# Patient Record
Sex: MALE | Race: WHITE | NOT HISPANIC OR LATINO | Employment: OTHER | URBAN - METROPOLITAN AREA
[De-identification: names, ages, dates, MRNs, and addresses within clinical notes are randomized per-mention and may not be internally consistent; named-entity substitution may affect disease eponyms.]

---

## 2017-03-27 ENCOUNTER — ANESTHESIA EVENT (OUTPATIENT)
Dept: SURGERY | Facility: CLINIC | Age: 77
DRG: 470 | End: 2017-03-27
Payer: COMMERCIAL

## 2017-03-27 RX ORDER — GLIMEPIRIDE 2 MG/1
2 TABLET ORAL EVERY MORNING
COMMUNITY

## 2017-03-27 RX ORDER — LIRAGLUTIDE 6 MG/ML
1.2 INJECTION SUBCUTANEOUS EVERY MORNING
COMMUNITY

## 2017-03-27 ASSESSMENT — LIFESTYLE VARIABLES: TOBACCO_USE: 1

## 2017-03-28 ENCOUNTER — APPOINTMENT (OUTPATIENT)
Dept: GENERAL RADIOLOGY | Facility: CLINIC | Age: 77
DRG: 470 | End: 2017-03-28
Attending: ORTHOPAEDIC SURGERY
Payer: COMMERCIAL

## 2017-03-28 ENCOUNTER — ANESTHESIA (OUTPATIENT)
Dept: SURGERY | Facility: CLINIC | Age: 77
DRG: 470 | End: 2017-03-28
Payer: COMMERCIAL

## 2017-03-28 ENCOUNTER — HOSPITAL ENCOUNTER (INPATIENT)
Facility: CLINIC | Age: 77
LOS: 1 days | Discharge: HOME | DRG: 470 | End: 2017-03-29
Attending: ORTHOPAEDIC SURGERY | Admitting: ORTHOPAEDIC SURGERY
Payer: COMMERCIAL

## 2017-03-28 ENCOUNTER — APPOINTMENT (OUTPATIENT)
Dept: PHYSICAL THERAPY | Facility: CLINIC | Age: 77
DRG: 470 | End: 2017-03-28
Attending: ORTHOPAEDIC SURGERY
Payer: COMMERCIAL

## 2017-03-28 DIAGNOSIS — M19.90 DJD (DEGENERATIVE JOINT DISEASE): ICD-10-CM

## 2017-03-28 DIAGNOSIS — Z96.642 STATUS POST LEFT HIP REPLACEMENT: Primary | ICD-10-CM

## 2017-03-28 LAB
ABO + RH BLD: NORMAL
ABO + RH BLD: NORMAL
BLD GP AB SCN SERPL QL: NORMAL
BLOOD BANK CMNT PATIENT-IMP: NORMAL
CREAT SERPL-MCNC: 0.94 MG/DL (ref 0.66–1.25)
GFR SERPL CREATININE-BSD FRML MDRD: 78 ML/MIN/1.7M2
GLUCOSE BLDC GLUCOMTR-MCNC: 189 MG/DL (ref 70–99)
GLUCOSE BLDC GLUCOMTR-MCNC: 196 MG/DL (ref 70–99)
GLUCOSE BLDC GLUCOMTR-MCNC: 230 MG/DL (ref 70–99)
GLUCOSE BLDC GLUCOMTR-MCNC: 237 MG/DL (ref 70–99)
GLUCOSE BLDC GLUCOMTR-MCNC: 248 MG/DL (ref 70–99)
GLUCOSE SERPL-MCNC: 175 MG/DL (ref 70–99)
HGB BLD-MCNC: 14 G/DL (ref 13.3–17.7)
PLATELET # BLD AUTO: 184 10E9/L (ref 150–450)
POTASSIUM SERPL-SCNC: 4.5 MMOL/L (ref 3.4–5.3)
SPECIMEN EXP DATE BLD: NORMAL

## 2017-03-28 PROCEDURE — 36000063 ZZH SURGERY LEVEL 4 EA 15 ADDTL MIN: Performed by: ORTHOPAEDIC SURGERY

## 2017-03-28 PROCEDURE — 86850 RBC ANTIBODY SCREEN: CPT | Performed by: ORTHOPAEDIC SURGERY

## 2017-03-28 PROCEDURE — 82947 ASSAY GLUCOSE BLOOD QUANT: CPT | Performed by: ORTHOPAEDIC SURGERY

## 2017-03-28 PROCEDURE — 97110 THERAPEUTIC EXERCISES: CPT | Mod: GP

## 2017-03-28 PROCEDURE — 36415 COLL VENOUS BLD VENIPUNCTURE: CPT | Performed by: ORTHOPAEDIC SURGERY

## 2017-03-28 PROCEDURE — 25800025 ZZH RX 258: Performed by: ANESTHESIOLOGY

## 2017-03-28 PROCEDURE — 25000128 H RX IP 250 OP 636: Performed by: PHYSICIAN ASSISTANT

## 2017-03-28 PROCEDURE — 25000125 ZZHC RX 250: Performed by: ORTHOPAEDIC SURGERY

## 2017-03-28 PROCEDURE — 25000128 H RX IP 250 OP 636: Performed by: NURSE ANESTHETIST, CERTIFIED REGISTERED

## 2017-03-28 PROCEDURE — 99207 ZZC CONSULT E&M CHANGED TO INITIAL LEVEL: CPT | Performed by: PHYSICIAN ASSISTANT

## 2017-03-28 PROCEDURE — 25800025 ZZH RX 258: Performed by: ORTHOPAEDIC SURGERY

## 2017-03-28 PROCEDURE — 25000566 ZZH SEVOFLURANE, EA 15 MIN: Performed by: ORTHOPAEDIC SURGERY

## 2017-03-28 PROCEDURE — 25000132 ZZH RX MED GY IP 250 OP 250 PS 637: Performed by: PHYSICIAN ASSISTANT

## 2017-03-28 PROCEDURE — 40000940 XR PELVIS AND HIP PORTABLE LEFT 1 VIEW

## 2017-03-28 PROCEDURE — 86900 BLOOD TYPING SEROLOGIC ABO: CPT | Performed by: ORTHOPAEDIC SURGERY

## 2017-03-28 PROCEDURE — C1776 JOINT DEVICE (IMPLANTABLE): HCPCS | Performed by: ORTHOPAEDIC SURGERY

## 2017-03-28 PROCEDURE — 97530 THERAPEUTIC ACTIVITIES: CPT | Mod: GP

## 2017-03-28 PROCEDURE — 37000009 ZZH ANESTHESIA TECHNICAL FEE, EACH ADDTL 15 MIN: Performed by: ORTHOPAEDIC SURGERY

## 2017-03-28 PROCEDURE — 0SRB0JA REPLACEMENT OF LEFT HIP JOINT WITH SYNTHETIC SUBSTITUTE, UNCEMENTED, OPEN APPROACH: ICD-10-PCS | Performed by: ORTHOPAEDIC SURGERY

## 2017-03-28 PROCEDURE — 25000131 ZZH RX MED GY IP 250 OP 636 PS 637: Performed by: ORTHOPAEDIC SURGERY

## 2017-03-28 PROCEDURE — 25000125 ZZHC RX 250: Performed by: ANESTHESIOLOGY

## 2017-03-28 PROCEDURE — 37000008 ZZH ANESTHESIA TECHNICAL FEE, 1ST 30 MIN: Performed by: ORTHOPAEDIC SURGERY

## 2017-03-28 PROCEDURE — 99207 ZZC APP CREDIT; MD BILLING SHARED VISIT: CPT | Performed by: INTERNAL MEDICINE

## 2017-03-28 PROCEDURE — 12000007 ZZH R&B INTERMEDIATE

## 2017-03-28 PROCEDURE — 36000065 ZZH SURGERY LEVEL 4 W FLUORO 1ST 30 MIN: Performed by: ORTHOPAEDIC SURGERY

## 2017-03-28 PROCEDURE — 27210794 ZZH OR GENERAL SUPPLY STERILE: Performed by: ORTHOPAEDIC SURGERY

## 2017-03-28 PROCEDURE — 40000277 XR SURGERY CARM FLUORO LESS THAN 5 MIN W STILLS

## 2017-03-28 PROCEDURE — 40000193 ZZH STATISTIC PT WARD VISIT

## 2017-03-28 PROCEDURE — 40000170 ZZH STATISTIC PRE-PROCEDURE ASSESSMENT II: Performed by: ORTHOPAEDIC SURGERY

## 2017-03-28 PROCEDURE — 82565 ASSAY OF CREATININE: CPT | Performed by: ORTHOPAEDIC SURGERY

## 2017-03-28 PROCEDURE — 99222 1ST HOSP IP/OBS MODERATE 55: CPT | Performed by: PHYSICIAN ASSISTANT

## 2017-03-28 PROCEDURE — 85049 AUTOMATED PLATELET COUNT: CPT | Performed by: ORTHOPAEDIC SURGERY

## 2017-03-28 PROCEDURE — 25000131 ZZH RX MED GY IP 250 OP 636 PS 637: Performed by: PHYSICIAN ASSISTANT

## 2017-03-28 PROCEDURE — 25000128 H RX IP 250 OP 636: Performed by: ORTHOPAEDIC SURGERY

## 2017-03-28 PROCEDURE — 85018 HEMOGLOBIN: CPT | Performed by: ORTHOPAEDIC SURGERY

## 2017-03-28 PROCEDURE — 86901 BLOOD TYPING SEROLOGIC RH(D): CPT | Performed by: ORTHOPAEDIC SURGERY

## 2017-03-28 PROCEDURE — 25000128 H RX IP 250 OP 636

## 2017-03-28 PROCEDURE — 25000128 H RX IP 250 OP 636: Performed by: ANESTHESIOLOGY

## 2017-03-28 PROCEDURE — 00000146 ZZHCL STATISTIC GLUCOSE BY METER IP

## 2017-03-28 PROCEDURE — 97161 PT EVAL LOW COMPLEX 20 MIN: CPT | Mod: GP

## 2017-03-28 PROCEDURE — 25000132 ZZH RX MED GY IP 250 OP 250 PS 637: Performed by: ORTHOPAEDIC SURGERY

## 2017-03-28 PROCEDURE — 84132 ASSAY OF SERUM POTASSIUM: CPT | Performed by: ORTHOPAEDIC SURGERY

## 2017-03-28 PROCEDURE — C1713 ANCHOR/SCREW BN/BN,TIS/BN: HCPCS | Performed by: ORTHOPAEDIC SURGERY

## 2017-03-28 PROCEDURE — 27210995 ZZH RX 272: Performed by: ORTHOPAEDIC SURGERY

## 2017-03-28 PROCEDURE — 25000125 ZZHC RX 250: Performed by: NURSE ANESTHETIST, CERTIFIED REGISTERED

## 2017-03-28 PROCEDURE — 71000012 ZZH RECOVERY PHASE 1 LEVEL 1 FIRST HR: Performed by: ORTHOPAEDIC SURGERY

## 2017-03-28 DEVICE — IMP SCR BONE CAN ACE 6.5X30MM 1217-30-500: Type: IMPLANTABLE DEVICE | Site: HIP | Status: FUNCTIONAL

## 2017-03-28 DEVICE — IMP APEX HOLE ELIMINATOR HIP DEPUY DURALOC 1246-03-000: Type: IMPLANTABLE DEVICE | Site: HIP | Status: FUNCTIONAL

## 2017-03-28 DEVICE — IMP CUP ACE PINNACLE 62MM 1217-22-062: Type: IMPLANTABLE DEVICE | Site: HIP | Status: FUNCTIONAL

## 2017-03-28 DEVICE — IMP SCR BONE CAN ACE 6.5X25MM 1217-25-500: Type: IMPLANTABLE DEVICE | Site: HIP | Status: FUNCTIONAL

## 2017-03-28 DEVICE — IMP SCR BONE CAN ACE 6.5X35MM 1217-35-500: Type: IMPLANTABLE DEVICE | Site: HIP | Status: FUNCTIONAL

## 2017-03-28 RX ORDER — HYDROXYZINE HYDROCHLORIDE 10 MG/1
10 TABLET, FILM COATED ORAL EVERY 6 HOURS PRN
Status: DISCONTINUED | OUTPATIENT
Start: 2017-03-28 | End: 2017-03-29 | Stop reason: HOSPADM

## 2017-03-28 RX ORDER — CEFAZOLIN SODIUM 1 G/3ML
1 INJECTION, POWDER, FOR SOLUTION INTRAMUSCULAR; INTRAVENOUS SEE ADMIN INSTRUCTIONS
Status: DISCONTINUED | OUTPATIENT
Start: 2017-03-28 | End: 2017-03-28

## 2017-03-28 RX ORDER — AMOXICILLIN 250 MG
1-2 CAPSULE ORAL 2 TIMES DAILY
Status: DISCONTINUED | OUTPATIENT
Start: 2017-03-28 | End: 2017-03-29 | Stop reason: HOSPADM

## 2017-03-28 RX ORDER — ALBUTEROL SULFATE 0.83 MG/ML
2.5 SOLUTION RESPIRATORY (INHALATION) EVERY 4 HOURS PRN
Status: DISCONTINUED | OUTPATIENT
Start: 2017-03-28 | End: 2017-03-28 | Stop reason: HOSPADM

## 2017-03-28 RX ORDER — ATORVASTATIN CALCIUM 20 MG/1
20 TABLET, FILM COATED ORAL AT BEDTIME
Status: DISCONTINUED | OUTPATIENT
Start: 2017-03-28 | End: 2017-03-29 | Stop reason: HOSPADM

## 2017-03-28 RX ORDER — BISACODYL 10 MG
10 SUPPOSITORY, RECTAL RECTAL DAILY PRN
Status: DISCONTINUED | OUTPATIENT
Start: 2017-03-28 | End: 2017-03-29 | Stop reason: HOSPADM

## 2017-03-28 RX ORDER — ONDANSETRON 2 MG/ML
4 INJECTION INTRAMUSCULAR; INTRAVENOUS EVERY 6 HOURS PRN
Status: DISCONTINUED | OUTPATIENT
Start: 2017-03-28 | End: 2017-03-29 | Stop reason: HOSPADM

## 2017-03-28 RX ORDER — POLYETHYLENE GLYCOL 3350 17 G/17G
17 POWDER, FOR SOLUTION ORAL DAILY PRN
Status: DISCONTINUED | OUTPATIENT
Start: 2017-03-28 | End: 2017-03-29 | Stop reason: HOSPADM

## 2017-03-28 RX ORDER — LIDOCAINE 40 MG/G
CREAM TOPICAL
Status: DISCONTINUED | OUTPATIENT
Start: 2017-03-28 | End: 2017-03-29 | Stop reason: HOSPADM

## 2017-03-28 RX ORDER — LIDOCAINE HYDROCHLORIDE 20 MG/ML
INJECTION, SOLUTION INFILTRATION; PERINEURAL PRN
Status: DISCONTINUED | OUTPATIENT
Start: 2017-03-28 | End: 2017-03-28

## 2017-03-28 RX ORDER — CEFAZOLIN SODIUM 2 G/100ML
2 INJECTION, SOLUTION INTRAVENOUS EVERY 8 HOURS
Status: COMPLETED | OUTPATIENT
Start: 2017-03-28 | End: 2017-03-29

## 2017-03-28 RX ORDER — ONDANSETRON 4 MG/1
4 TABLET, ORALLY DISINTEGRATING ORAL EVERY 30 MIN PRN
Status: DISCONTINUED | OUTPATIENT
Start: 2017-03-28 | End: 2017-03-28 | Stop reason: HOSPADM

## 2017-03-28 RX ORDER — FENTANYL CITRATE 50 UG/ML
INJECTION, SOLUTION INTRAMUSCULAR; INTRAVENOUS PRN
Status: DISCONTINUED | OUTPATIENT
Start: 2017-03-28 | End: 2017-03-28

## 2017-03-28 RX ORDER — CEFAZOLIN SODIUM 2 G/100ML
2 INJECTION, SOLUTION INTRAVENOUS
Status: COMPLETED | OUTPATIENT
Start: 2017-03-28 | End: 2017-03-28

## 2017-03-28 RX ORDER — FENTANYL CITRATE 0.05 MG/ML
25-50 INJECTION, SOLUTION INTRAMUSCULAR; INTRAVENOUS
Status: DISCONTINUED | OUTPATIENT
Start: 2017-03-28 | End: 2017-03-28 | Stop reason: HOSPADM

## 2017-03-28 RX ORDER — ACETAMINOPHEN 325 MG/1
975 TABLET ORAL EVERY 8 HOURS
Status: DISCONTINUED | OUTPATIENT
Start: 2017-03-28 | End: 2017-03-29 | Stop reason: HOSPADM

## 2017-03-28 RX ORDER — NEOSTIGMINE METHYLSULFATE 1 MG/ML
VIAL (ML) INJECTION PRN
Status: DISCONTINUED | OUTPATIENT
Start: 2017-03-28 | End: 2017-03-28

## 2017-03-28 RX ORDER — AMOXICILLIN 250 MG
1-2 CAPSULE ORAL 2 TIMES DAILY
Status: DISCONTINUED | OUTPATIENT
Start: 2017-03-28 | End: 2017-03-28

## 2017-03-28 RX ORDER — CYCLOBENZAPRINE HCL 5 MG
5 TABLET ORAL 3 TIMES DAILY PRN
Status: DISCONTINUED | OUTPATIENT
Start: 2017-03-28 | End: 2017-03-29 | Stop reason: HOSPADM

## 2017-03-28 RX ORDER — KETOROLAC TROMETHAMINE 15 MG/ML
15 INJECTION, SOLUTION INTRAMUSCULAR; INTRAVENOUS EVERY 6 HOURS
Status: COMPLETED | OUTPATIENT
Start: 2017-03-28 | End: 2017-03-29

## 2017-03-28 RX ORDER — HYDROMORPHONE HYDROCHLORIDE 1 MG/ML
.3-.5 INJECTION, SOLUTION INTRAMUSCULAR; INTRAVENOUS; SUBCUTANEOUS
Status: DISCONTINUED | OUTPATIENT
Start: 2017-03-28 | End: 2017-03-29 | Stop reason: HOSPADM

## 2017-03-28 RX ORDER — OXYCODONE HYDROCHLORIDE 5 MG/1
5-10 TABLET ORAL
Status: DISCONTINUED | OUTPATIENT
Start: 2017-03-28 | End: 2017-03-29 | Stop reason: HOSPADM

## 2017-03-28 RX ORDER — PROCHLORPERAZINE MALEATE 5 MG
5 TABLET ORAL EVERY 6 HOURS PRN
Status: DISCONTINUED | OUTPATIENT
Start: 2017-03-28 | End: 2017-03-29 | Stop reason: HOSPADM

## 2017-03-28 RX ORDER — NICOTINE POLACRILEX 4 MG
15-30 LOZENGE BUCCAL
Status: DISCONTINUED | OUTPATIENT
Start: 2017-03-28 | End: 2017-03-29 | Stop reason: HOSPADM

## 2017-03-28 RX ORDER — LISINOPRIL 10 MG/1
10 TABLET ORAL DAILY
Status: DISCONTINUED | OUTPATIENT
Start: 2017-03-29 | End: 2017-03-29 | Stop reason: HOSPADM

## 2017-03-28 RX ORDER — HYDROMORPHONE HYDROCHLORIDE 1 MG/ML
INJECTION, SOLUTION INTRAMUSCULAR; INTRAVENOUS; SUBCUTANEOUS
Status: COMPLETED
Start: 2017-03-28 | End: 2017-03-28

## 2017-03-28 RX ORDER — GLYCOPYRROLATE 0.2 MG/ML
INJECTION, SOLUTION INTRAMUSCULAR; INTRAVENOUS PRN
Status: DISCONTINUED | OUTPATIENT
Start: 2017-03-28 | End: 2017-03-28

## 2017-03-28 RX ORDER — AMOXICILLIN 250 MG
1-2 CAPSULE ORAL 2 TIMES DAILY PRN
Status: DISCONTINUED | OUTPATIENT
Start: 2017-03-28 | End: 2017-03-29 | Stop reason: HOSPADM

## 2017-03-28 RX ORDER — SODIUM CHLORIDE, SODIUM LACTATE, POTASSIUM CHLORIDE, CALCIUM CHLORIDE 600; 310; 30; 20 MG/100ML; MG/100ML; MG/100ML; MG/100ML
INJECTION, SOLUTION INTRAVENOUS CONTINUOUS
Status: DISCONTINUED | OUTPATIENT
Start: 2017-03-28 | End: 2017-03-29 | Stop reason: HOSPADM

## 2017-03-28 RX ORDER — KETOROLAC TROMETHAMINE 30 MG/ML
INJECTION, SOLUTION INTRAMUSCULAR; INTRAVENOUS PRN
Status: DISCONTINUED | OUTPATIENT
Start: 2017-03-28 | End: 2017-03-28 | Stop reason: HOSPADM

## 2017-03-28 RX ORDER — ONDANSETRON 2 MG/ML
4 INJECTION INTRAMUSCULAR; INTRAVENOUS EVERY 30 MIN PRN
Status: DISCONTINUED | OUTPATIENT
Start: 2017-03-28 | End: 2017-03-28 | Stop reason: HOSPADM

## 2017-03-28 RX ORDER — DEXTROSE MONOHYDRATE 25 G/50ML
25-50 INJECTION, SOLUTION INTRAVENOUS
Status: DISCONTINUED | OUTPATIENT
Start: 2017-03-28 | End: 2017-03-29 | Stop reason: HOSPADM

## 2017-03-28 RX ORDER — PROPOFOL 10 MG/ML
INJECTION, EMULSION INTRAVENOUS PRN
Status: DISCONTINUED | OUTPATIENT
Start: 2017-03-28 | End: 2017-03-28

## 2017-03-28 RX ORDER — BUPIVACAINE HYDROCHLORIDE AND EPINEPHRINE 5; 5 MG/ML; UG/ML
INJECTION, SOLUTION EPIDURAL; INTRACAUDAL; PERINEURAL PRN
Status: DISCONTINUED | OUTPATIENT
Start: 2017-03-28 | End: 2017-03-28 | Stop reason: HOSPADM

## 2017-03-28 RX ORDER — HYDRALAZINE HYDROCHLORIDE 20 MG/ML
10 INJECTION INTRAMUSCULAR; INTRAVENOUS EVERY 4 HOURS PRN
Status: DISCONTINUED | OUTPATIENT
Start: 2017-03-28 | End: 2017-03-29 | Stop reason: HOSPADM

## 2017-03-28 RX ORDER — SODIUM CHLORIDE 9 MG/ML
INJECTION, SOLUTION INTRAVENOUS CONTINUOUS
Status: DISCONTINUED | OUTPATIENT
Start: 2017-03-28 | End: 2017-03-29 | Stop reason: HOSPADM

## 2017-03-28 RX ORDER — ACETAMINOPHEN 500 MG
1000 TABLET ORAL ONCE
Status: COMPLETED | OUTPATIENT
Start: 2017-03-28 | End: 2017-03-28

## 2017-03-28 RX ORDER — NALOXONE HYDROCHLORIDE 0.4 MG/ML
.1-.4 INJECTION, SOLUTION INTRAMUSCULAR; INTRAVENOUS; SUBCUTANEOUS
Status: DISCONTINUED | OUTPATIENT
Start: 2017-03-28 | End: 2017-03-29 | Stop reason: HOSPADM

## 2017-03-28 RX ORDER — ONDANSETRON 4 MG/1
4 TABLET, ORALLY DISINTEGRATING ORAL EVERY 6 HOURS PRN
Status: DISCONTINUED | OUTPATIENT
Start: 2017-03-28 | End: 2017-03-29 | Stop reason: HOSPADM

## 2017-03-28 RX ORDER — TEMAZEPAM 7.5 MG/1
7.5 CAPSULE ORAL
Status: DISCONTINUED | OUTPATIENT
Start: 2017-03-29 | End: 2017-03-29 | Stop reason: HOSPADM

## 2017-03-28 RX ORDER — DEXTROSE MONOHYDRATE, SODIUM CHLORIDE, AND POTASSIUM CHLORIDE 50; 1.49; 4.5 G/1000ML; G/1000ML; G/1000ML
INJECTION, SOLUTION INTRAVENOUS CONTINUOUS
Status: DISCONTINUED | OUTPATIENT
Start: 2017-03-28 | End: 2017-03-28

## 2017-03-28 RX ORDER — MEPERIDINE HYDROCHLORIDE 25 MG/ML
12.5 INJECTION INTRAMUSCULAR; INTRAVENOUS; SUBCUTANEOUS EVERY 5 MIN PRN
Status: DISCONTINUED | OUTPATIENT
Start: 2017-03-28 | End: 2017-03-28 | Stop reason: HOSPADM

## 2017-03-28 RX ORDER — ACETAMINOPHEN 325 MG/1
650 TABLET ORAL EVERY 4 HOURS PRN
Status: DISCONTINUED | OUTPATIENT
Start: 2017-03-31 | End: 2017-03-29 | Stop reason: HOSPADM

## 2017-03-28 RX ORDER — SODIUM CHLORIDE, SODIUM LACTATE, POTASSIUM CHLORIDE, CALCIUM CHLORIDE 600; 310; 30; 20 MG/100ML; MG/100ML; MG/100ML; MG/100ML
INJECTION, SOLUTION INTRAVENOUS CONTINUOUS
Status: DISCONTINUED | OUTPATIENT
Start: 2017-03-28 | End: 2017-03-28 | Stop reason: HOSPADM

## 2017-03-28 RX ADMIN — SODIUM CHLORIDE, POTASSIUM CHLORIDE, SODIUM LACTATE AND CALCIUM CHLORIDE: 600; 310; 30; 20 INJECTION, SOLUTION INTRAVENOUS at 06:52

## 2017-03-28 RX ADMIN — TRANEXAMIC ACID 1 G: 100 INJECTION, SOLUTION INTRAVENOUS at 09:59

## 2017-03-28 RX ADMIN — HYDROMORPHONE HYDROCHLORIDE 0.5 MG: 1 INJECTION, SOLUTION INTRAMUSCULAR; INTRAVENOUS; SUBCUTANEOUS at 09:20

## 2017-03-28 RX ADMIN — ATORVASTATIN CALCIUM 20 MG: 20 TABLET, FILM COATED ORAL at 21:10

## 2017-03-28 RX ADMIN — TRANEXAMIC ACID 1 G: 100 INJECTION, SOLUTION INTRAVENOUS at 07:45

## 2017-03-28 RX ADMIN — ACETAMINOPHEN 975 MG: 325 TABLET, FILM COATED ORAL at 17:09

## 2017-03-28 RX ADMIN — HYDROMORPHONE HYDROCHLORIDE 0.5 MG: 1 INJECTION, SOLUTION INTRAMUSCULAR; INTRAVENOUS; SUBCUTANEOUS at 14:36

## 2017-03-28 RX ADMIN — FENTANYL CITRATE 50 MCG: 50 INJECTION, SOLUTION INTRAMUSCULAR; INTRAVENOUS at 07:35

## 2017-03-28 RX ADMIN — SENNOSIDES AND DOCUSATE SODIUM 2 TABLET: 8.6; 5 TABLET ORAL at 21:10

## 2017-03-28 RX ADMIN — PHENYLEPHRINE HYDROCHLORIDE 100 MCG: 10 INJECTION, SOLUTION INTRAMUSCULAR; INTRAVENOUS; SUBCUTANEOUS at 08:52

## 2017-03-28 RX ADMIN — SODIUM CHLORIDE, POTASSIUM CHLORIDE, SODIUM LACTATE AND CALCIUM CHLORIDE: 600; 310; 30; 20 INJECTION, SOLUTION INTRAVENOUS at 10:09

## 2017-03-28 RX ADMIN — OMEPRAZOLE 20 MG: 20 CAPSULE, DELAYED RELEASE ORAL at 17:10

## 2017-03-28 RX ADMIN — FENTANYL CITRATE 50 MCG: 50 INJECTION, SOLUTION INTRAMUSCULAR; INTRAVENOUS at 07:50

## 2017-03-28 RX ADMIN — POTASSIUM CHLORIDE, DEXTROSE MONOHYDRATE AND SODIUM CHLORIDE: 150; 5; 450 INJECTION, SOLUTION INTRAVENOUS at 11:59

## 2017-03-28 RX ADMIN — KETOROLAC TROMETHAMINE 15 MG: 15 INJECTION, SOLUTION INTRAMUSCULAR; INTRAVENOUS at 17:10

## 2017-03-28 RX ADMIN — OXYCODONE HYDROCHLORIDE 10 MG: 5 TABLET ORAL at 17:09

## 2017-03-28 RX ADMIN — PHENYLEPHRINE HYDROCHLORIDE 50 MCG: 10 INJECTION, SOLUTION INTRAMUSCULAR; INTRAVENOUS; SUBCUTANEOUS at 10:01

## 2017-03-28 RX ADMIN — PROPOFOL 200 MG: 10 INJECTION, EMULSION INTRAVENOUS at 07:35

## 2017-03-28 RX ADMIN — INSULIN HUMAN 4 UNITS: 100 INJECTION, SOLUTION PARENTERAL at 11:20

## 2017-03-28 RX ADMIN — INSULIN ASPART 2 UNITS: 100 INJECTION, SOLUTION INTRAVENOUS; SUBCUTANEOUS at 17:29

## 2017-03-28 RX ADMIN — FENTANYL CITRATE 50 MCG: 50 INJECTION, SOLUTION INTRAMUSCULAR; INTRAVENOUS at 08:16

## 2017-03-28 RX ADMIN — SODIUM CHLORIDE: 9 INJECTION, SOLUTION INTRAVENOUS at 22:31

## 2017-03-28 RX ADMIN — PHENYLEPHRINE HYDROCHLORIDE 200 MCG: 10 INJECTION, SOLUTION INTRAMUSCULAR; INTRAVENOUS; SUBCUTANEOUS at 07:38

## 2017-03-28 RX ADMIN — PHENYLEPHRINE HYDROCHLORIDE 50 MCG: 10 INJECTION, SOLUTION INTRAMUSCULAR; INTRAVENOUS; SUBCUTANEOUS at 07:57

## 2017-03-28 RX ADMIN — KETOROLAC TROMETHAMINE 15 MG: 15 INJECTION, SOLUTION INTRAMUSCULAR; INTRAVENOUS at 10:38

## 2017-03-28 RX ADMIN — CEFAZOLIN SODIUM 2 G: 2 INJECTION, SOLUTION INTRAVENOUS at 07:40

## 2017-03-28 RX ADMIN — ACETAMINOPHEN 975 MG: 325 TABLET, FILM COATED ORAL at 22:22

## 2017-03-28 RX ADMIN — PHENYLEPHRINE HYDROCHLORIDE 100 MCG: 10 INJECTION, SOLUTION INTRAMUSCULAR; INTRAVENOUS; SUBCUTANEOUS at 07:41

## 2017-03-28 RX ADMIN — PHENYLEPHRINE HYDROCHLORIDE 100 MCG: 10 INJECTION, SOLUTION INTRAMUSCULAR; INTRAVENOUS; SUBCUTANEOUS at 08:28

## 2017-03-28 RX ADMIN — SODIUM CHLORIDE, POTASSIUM CHLORIDE, SODIUM LACTATE AND CALCIUM CHLORIDE: 600; 310; 30; 20 INJECTION, SOLUTION INTRAVENOUS at 08:19

## 2017-03-28 RX ADMIN — HYDROMORPHONE HYDROCHLORIDE 0.5 MG: 1 INJECTION, SOLUTION INTRAMUSCULAR; INTRAVENOUS; SUBCUTANEOUS at 10:38

## 2017-03-28 RX ADMIN — CEFAZOLIN SODIUM 1 G: 2 INJECTION, SOLUTION INTRAVENOUS at 09:40

## 2017-03-28 RX ADMIN — PHENYLEPHRINE HYDROCHLORIDE 100 MCG: 10 INJECTION, SOLUTION INTRAMUSCULAR; INTRAVENOUS; SUBCUTANEOUS at 09:04

## 2017-03-28 RX ADMIN — ROCURONIUM BROMIDE 50 MG: 10 INJECTION INTRAVENOUS at 07:35

## 2017-03-28 RX ADMIN — MIDAZOLAM HYDROCHLORIDE 2 MG: 1 INJECTION, SOLUTION INTRAMUSCULAR; INTRAVENOUS at 07:26

## 2017-03-28 RX ADMIN — NEOSTIGMINE METHYLSULFATE 5 MG: 1 INJECTION INTRAMUSCULAR; INTRAVENOUS; SUBCUTANEOUS at 09:50

## 2017-03-28 RX ADMIN — SODIUM CHLORIDE: 9 INJECTION, SOLUTION INTRAVENOUS at 14:37

## 2017-03-28 RX ADMIN — GLYCOPYRROLATE 0.4 MG: 0.2 INJECTION, SOLUTION INTRAMUSCULAR; INTRAVENOUS at 09:50

## 2017-03-28 RX ADMIN — CEFAZOLIN SODIUM 2 G: 2 INJECTION, SOLUTION INTRAVENOUS at 17:08

## 2017-03-28 RX ADMIN — HYDROMORPHONE HYDROCHLORIDE 0.5 MG: 1 INJECTION, SOLUTION INTRAMUSCULAR; INTRAVENOUS; SUBCUTANEOUS at 11:58

## 2017-03-28 RX ADMIN — LIDOCAINE HYDROCHLORIDE 30 MG: 20 INJECTION, SOLUTION INFILTRATION; PERINEURAL at 07:35

## 2017-03-28 RX ADMIN — ACETAMINOPHEN 1000 MG: 500 TABLET ORAL at 06:51

## 2017-03-28 RX ADMIN — INSULIN GLARGINE 18 UNITS: 100 INJECTION, SOLUTION SUBCUTANEOUS at 21:17

## 2017-03-28 RX ADMIN — LIDOCAINE HYDROCHLORIDE 1 ML: 10 INJECTION, SOLUTION EPIDURAL; INFILTRATION; INTRACAUDAL; PERINEURAL at 06:53

## 2017-03-28 RX ADMIN — HYDROMORPHONE HYDROCHLORIDE 0.5 MG: 1 INJECTION, SOLUTION INTRAMUSCULAR; INTRAVENOUS; SUBCUTANEOUS at 09:37

## 2017-03-28 RX ADMIN — CISATRACURIUM BESYLATE 4 MG: 2 INJECTION INTRAVENOUS at 09:16

## 2017-03-28 RX ADMIN — PHENYLEPHRINE HYDROCHLORIDE 50 MCG: 10 INJECTION, SOLUTION INTRAMUSCULAR; INTRAVENOUS; SUBCUTANEOUS at 07:53

## 2017-03-28 RX ADMIN — KETOROLAC TROMETHAMINE 15 MG: 15 INJECTION, SOLUTION INTRAMUSCULAR; INTRAVENOUS at 22:22

## 2017-03-28 RX ADMIN — FENTANYL CITRATE 50 MCG: 50 INJECTION, SOLUTION INTRAMUSCULAR; INTRAVENOUS at 09:10

## 2017-03-28 RX ADMIN — CISATRACURIUM BESYLATE 6 MG: 2 INJECTION INTRAVENOUS at 08:47

## 2017-03-28 RX ADMIN — PHENYLEPHRINE HYDROCHLORIDE 100 MCG: 10 INJECTION, SOLUTION INTRAMUSCULAR; INTRAVENOUS; SUBCUTANEOUS at 08:07

## 2017-03-28 RX ADMIN — FENTANYL CITRATE 50 MCG: 50 INJECTION, SOLUTION INTRAMUSCULAR; INTRAVENOUS at 08:22

## 2017-03-28 RX ADMIN — CISATRACURIUM BESYLATE 4 MG: 2 INJECTION INTRAVENOUS at 07:49

## 2017-03-28 NOTE — PROGRESS NOTES
03/28/17 1500   Quick Adds   Type of Visit Initial PT Evaluation   Living Environment   Lives With spouse   Living Arrangements house   Home Accessibility stairs to enter home   Number of Stairs to Enter Home 2   Number of Stairs Within Home 0   Stair Railings at Home none   Transportation Available car;family or friend will provide   Living Environment Comment Lives in a ranch style home, [address]   Self-Care   Usual Activity Tolerance good   Current Activity Tolerance moderate   Regular Exercise yes   Activity/Exercise Type walking   Exercise Amount/Frequency daily   Equipment Currently Used at Home walker, standard   Functional Level Prior   Ambulation 0-->independent   Transferring 0-->independent   Fall history within last six months no   Which of the above functional risks had a recent onset or change? none   Prior Functional Level Comment Pt reports independence at baseline. 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 Wife is able to assist as needed.   General Information   Onset of Illness/Injury or Date of Surgery - Date 03/28/17   Referring Physician Dr. Howell   Patient/Family Goals Statement To go home   Pertinent History of Current Problem (include personal factors and/or comorbidities that impact the POC) Pt is a 76 year old male admitted for left GLENROY on 3/28/17.   Precautions/Limitations fall precautions   Cognitive Status Examination   Orientation orientation to person, place and time   Level of Consciousness alert   Follows Commands and Answers Questions 100% of the time   Pain Assessment   Patient Currently in Pain Yes, see Vital Sign flowsheet   Range of Motion (ROM)   ROM Quick Adds No deficits were identified   Strength   Strength Comments WFL, except left hip grossly 4+/5   Bed Mobility   Bed Mobility Comments Modified independent with bedrails and HOB elevated.   Transfer Skills   Transfer Comments Sit to/from stand with CGA.   Gait   Gait Comments NT   Balance  "  Balance Comments Good in sitting, fair in standing   General Therapy Interventions   Planned Therapy Interventions balance training;bed mobility training;gait training;strengthening;transfer training;progressive activity/exercise;home program guidelines   Clinical Impression   Criteria for Skilled Therapeutic Intervention yes, treatment indicated   PT Diagnosis Impaired ambulation   Influenced by the following impairments Decreased strength, decreased endurance, decreased balance, post surgical pain   Functional limitations due to impairments Difficulty with bed mobility, transfers, ambulation, and stair management.   Clinical Presentation Stable/Uncomplicated   Clinical Presentation Rationale Vital signs stable, pain controlled   Clinical Decision Making (Complexity) Low complexity   Therapy Frequency` daily   Predicted Duration of Therapy Intervention (days/wks) 3 days   Anticipated Equipment Needs at Discharge walker   Anticipated Discharge Disposition Home with Assist   Risk & Benefits of therapy have been explained Yes   Patient, Family & other staff in agreement with plan of care Yes   [facility] \"6 Clicks\"   2016, Trustees of [facility], under license to [employer].  All rights reserved.   6 Clicks Short Forms Basic Mobility Inpatient Short Form   [facility]  \"6 Clicks\" V.2 Basic Mobility Inpatient Short Form   1. Turning from your back to your side while in a flat bed without using bedrails? 4 - None   2. Moving from lying on your back to sitting on the side of a flat bed without using bedrails? 4 - None   3. Moving to and from a bed to a chair (including a wheelchair)? 3 - A Little   4. Standing up from a chair using your arms (e.g., wheelchair, or bedside chair)? 3 - A Little   5. To walk in hospital room? 3 - A Little   6. Climbing 3-5 steps with a railing? 2 - A Lot   Basic Mobility Raw Score (Score out of 24.Lower scores equate to lower levels of function) 19 "   Total Evaluation Time   Total Evaluation Time (Minutes) 15

## 2017-03-28 NOTE — OP NOTE
DATE OF SERVICE:  3/28/2017    SURGEON  JOSE QURESHI M.D.    ASSISTANT  Buzz Borges, MEGAN    PREOPERATIVE DIAGNOSIS   Left hip osteoarthritis, failed to respond to conservative management.    POSTOPERATIVE DIAGNOSIS   Left hip osteoarthritis, failed to respond to conservative management.    TITLE OF PROCEDURE   Left total hip arthroplasty, Depuy uncemented components, direct anterior approach.    PROCEDURE  The patient was brought to the operating room and after satisfactory anesthesia was placed on the [place] table. The left  lower extremity was then prepped and draped in the usual sterile fashion. Image intensification was utilized during the case for component positioning as well as leg length assessment. An incision was made just lateral to the ASIS and coursing toward the proximal femur. Dissection was carried down to the TFL. The fascia overlying the TFL was incised and dissection was carried down to the interval between TFL and rectus femoris. This was identified. A lateral cobra retractor was placed followed by a medial cobra around the femoral neck. The leash vessels, anterior circumflex, was identified and was coagulated. The underlying fascia was released. Dissection was carried down to the hip capsule. Capsule was identified and a capsulotomy was performed in a T-type fashion first along the intertrochanteric line and then secondarily up along the femoral neck and head, finally completed by releasing along the saddle of the trochanter. The capsular edges were tagged for later repair. The hip was then externally rotated and medial capsular release was performed such that the lesser trochanter could be palpated. Following this, the femoral neck was osteotomized as per the preoperative plan. The femoral head was removed with a corkscrew without difficulty. The acetabulum was exposed and was reamed sequentially up to 62 mm. This was reamed under both direct visualization as well as the aid of image  intensification. A 62 mm [place] cup was impacted into place in approximately 40 to 45 degrees of abduction, and 15 degrees of anteversion. Three screws were placed and this gave excellent fixation. The 36 mm neutral liner was impacted into place.     Attention was then directed to the femur. The hook was placed underneath the proximal aspect of the femur between the trochanteric ridge and gluteus yousuf. The hip was then brought into external rotation, adduction, and extension. The femur was then carefully elevated using the appropriate releases off the inside of the greater trochanter. Once the femur was elevated, a starter broach was placed followed by sequential broaches. The broaches were performed up to size 15 corail, which gave excellent torsinal as well as axial stability. Trial reduction was performed with a +8.5mm head, coxa vara neck. The hip was reduced and with hip reduction the combined anteversion looked excellent. The hip was brought into full extension and external rotation. There was no evidence of instability. As well, x-rays were printed and compared with the opposite side and found to have good length and restoration of offset.  It was felt that an additional stem size could not be placed.  The hip was then dislocated and then the proximal femur was then brought back up into the proximal aspect of the wound. The real size 15 Corail stem, coxa vara  was impacted into place. Again this gave excellent torsion as well as axial stability. The real +8.5mm ceramic biolox head was impacted into place and the hip was reduced again. The image intensification confirmed excellent position of the components.   A 3 minute dilute betadine soak was performed.  The wound was thoroughly irrigated. The capsule was closed with interrupted 0 Vicryl suture and tissues infiltrated with toradol/marcaine mixture. The tensor fascia was closed with a running 0 V-lock suture. The subcutaneous layer was closed with  interrupted 2-0 Vicryl, 3-0 subcuticular monocryl was placed followed by Dermabond Prineo. Sterile dressing was applied. The patient left the operating room in satisfactory condition. Patient received 1 gm of tranexamic acid pre-op and at closure.

## 2017-03-28 NOTE — IP AVS SNAPSHOT
[address] [facility]    [address] [place] [address]    Phone:  [phone]                                       After Visit Summary   3/28/2017    Fuad Sanchez    MRN: [id number]           After Visit Summary Signature Page     I have received my discharge instructions, and my questions have been answered. I have discussed any challenges I see with this plan with the nurse or doctor.    ..........................................................................................................................................  Patient/Patient Representative Signature      ..........................................................................................................................................  Patient Representative Print Name and Relationship to Patient    ..................................................               ................................................  Date                                            Time    ..........................................................................................................................................  Reviewed by Signature/Title    ...................................................              ..............................................  Date                                                            Time

## 2017-03-28 NOTE — CONSULTS
DATE OF SERVICE:  03/28/2017      PRIMARY CARE PHYSICIAN:  Andrzej Pepper MD       REQUESTING PHYSICIAN:  Dr. Howell.      REASON FOR CONSULTATION:  Postoperative medical co-management.      HISTORY OF PRESENT ILLNESS:  Fuad Sanchez is a 76-year-old male with a past medical history significant for coronary artery disease status post prior CABG and stenting, hypertension, hyperlipidemia, type 2 diabetes mellitus and gastroesophageal reflux disease who is postoperative day 0 status post left total hip arthroplasty.  The procedure was performed by Dr. Howell under general anesthesia with an estimated blood loss of 250 mL.  There were no intraoperative complications.  The procedure was performed due to the patient's advanced osteoarthritis after failing outpatient management.  The patient is presently evaluated in his room on the orthopedic floor.  He reports he is feeling quite well after surgery.  He presently rates his pain 5/10.  He is tolerating clear liquids without difficulty.  He has no nausea or vomiting.  He has a Sanchez catheter in place.  He denies any chest pain, shortness of breath, visual changes, focal weakness, numbness, tingling.  The patient is an insulin-dependent diabetic.  He last took his Lantus the evening prior to his procedure at a reduced dose of 11 units.  He did not take any of his other antidiabetic medications this morning prior to surgery.  In regard to his blood sugar management, he typically checks his blood sugar daily for about a week.  If these numbers are stable, he will defer checking his blood sugar for up to several weeks before rechecking again.  The patient also has a cardiac history including a prior CABG and stenting due to failed graft.  He denies any chest pain or dyspnea since this time.  He continues on a daily aspirin, which he was advised to continue throughout his postoperative period by his PCP.  He no longer follows with Cardiology.      REVIEW OF SYSTEMS:  A  10-point review of systems was conducted and is negative aside from the information in the HPI.      PAST MEDICAL HISTORY:   1.  Coronary artery disease.  The patient had 3-vessel CABG in 2003 with subsequent stenting due to failed graft.  He had a cardiac catheterization in 2007 which showed stable disease per chart review.   2.  Hypertension.   3.  Hyperlipidemia.   4.  GERD.   5.  Type 2 diabetes mellitus.  The patient's last hemoglobin A1c on 03/22/2017 was 7.3.  His PTA regimen includes Amaryl, Victoza, metformin and Lantus at bedtime.   6.  Gastroesophageal reflux disease.      PAST SURGICAL HISTORY:   1.  Tonsillectomy.   2.  Hernia repair.   3.  Coronary artery bypass graft x3 vessels in 2003.   4.  Back surgery.   5.  Appendectomy.      ALLERGIES:  Aspirin, GI disturbance.      PRIOR TO ADMISSION MEDICATIONS:   1.  Aspirin 81 mg a day.   2.  Atorvastatin 20 mg by mouth at bedtime.   3.  Folic acid, vitamin B6, vitamin B12 one tablet by mouth daily.   4.  Amaryl 2 mg by mouth daily.   5.  Lantus 22 units subcutaneously at bedtime.   6.  Victoza 1.2 mg subcutaneously daily.   7.  Lisinopril 10 mg by mouth daily.   8.  Metformin 1000 mg b.i.d. with meals.   9.  Nitroglycerin 0.4 mg under the tongue every 5 minutes p.r.n. for chest pain.   10.  Omeprazole 20 mg by mouth 2 times daily before meals.      SOCIAL HISTORY:  The patient reports occasional alcohol use, though he was recently on vacation for several weeks in [place] and had multiple drinks daily.  No history of abuse, denies drug use.  Former smoker, quit in 1970 after approximately 10 years of smoking.  He is .      FAMILY HISTORY:  His mother has a history of rheumatoid arthritis.      LABORATORY EVALUATION:  Preoperative labs include potassium 4.5, creatinine 0.94, hemoglobin 14.0, platelets 184.  Blood sugars today have ranged from 175-248.      PHYSICAL EXAMINATION:   VITAL SIGNS:  Temperature 97.5, heart rate 64, blood pressure 134/71,  respiratory rate 12, oxygen saturation is 96% on 2 liters nasal cannula.   GENERAL:  Alert and oriented male sitting up in bed, appears comfortable and is pleasantly conversant.    HEENT:  Eyes:  Patient is wearing glasses.  Pupils are equal and reactive to light, EOMI.   Mucous membranes are moist.   CARDIOVASCULAR:  Regular rate and rhythm, no murmurs appreciated.   RESPIRATORY:  Lungs are clear to auscultation bilaterally, no increased work of breathing or wheezing.   GASTROINTESTINAL:  Positive bowel sounds.  Abdomen is soft, nontender to palpation.   SKIN:  Warm and dry.   MUSCULOSKELETAL:  The patient moves all 4 extremities.   NEUROLOGIC:   Cranial nerves II-XII are grossly intact.  No focal deficits.   GENITOURINARY:  Sanchez catheter is in place.      ASSESSMENT AND PLAN:  Fuad Sanchez is a 76-year-old male with a past medical history significant for coronary artery disease, status post prior coronary artery bypass graft and stenting, hypertension, hyperlipidemia, type 2 diabetes mellitus and gastroesophageal reflux disease who is postoperative day 0 status post left total hip arthroplasty for degenerative joint disease.  The Hospitalist Service was consulted for postoperative medical co-management.   1.  Postoperative day 0 status post left total hip arthroplasty for degenerative joint disease.  The procedure was performed under general anesthesia with an estimated blood loss of 250 mL.  The patient tolerated the procedure without complication.   --Primary management per Orthopedic Service including deep venous thrombosis prophylaxis and pain control.   --Per preoperative history and physical, it was recommended that the patient continue his daily baby aspirin throughout his perioperative period.  His last dose was 03/27/2017.  Will defer to Orthopedics when it is appropriate to resume this.   --Lovenox scheduled for 03/29/2017.   --Creatinine pending this afternoon.  We will check hemoglobin in the  a.m.   --Physical therapy and occupational therapy.    2.  Type 2 diabetes mellitus, insulin-dependent.  The patient's last hemoglobin A1c from 03/23/2017 was 7.3.  Prior to admission regimen includes Amaryl, metformin, Victoza and Lantus.  At present he is checking his blood sugars consistently for about a week.  If these are stable he does not check them for several weeks before resuming checking again.  We discussed that it is better to monitor blood sugars daily as it is unlikely that his intake is exactly equal every day and there are other factors that can affect his blood sugar.  The patient last took 11 units of Lantus on the evening of 03/27.  He did not take any of his other antidiabetic medications this morning prior to surgery.   --Continue prior to admission Lantus at reduced dose as patient increases his oral intake.  Will order 18 units for tonight.   --Medium dose sliding scale insulin ordered.   --Hold prior to admission metformin, Amaryl and Victoza.   --Monitor for signs of hypoglycemia.   3.  Coronary artery disease, status post prior 3-vessel coronary artery bypass graft and subsequent stenting.  No active chest pain.  This appears stable.   --Continue prior to admission lisinopril with hold parameters.   --Resume prior to admission aspirin when okay with Orthopedics, as discussed above.  It was recommended by his primary care physician due to his cardiac history to continue throughout the perioperative period.   --Continue prior to admission statin.   4.  Hypertension.  Continue prior to admission lisinopril with hold parameters.  As needed hydralazine available if needed.   5.  Gastroesophageal reflux disease.  Continue prior to admission proton pump inhibitor.      The Hospitalist Service will continue to follow along.  We appreciate the consultation.  Please do not hesitate to call with any questions or concerns.      The patient was seen and examined with Dr. Soni, who agrees with the  above plan.         AILIN FERRARI MD       As dictated by NEY MCLAUGHLIN PA-C            D: 2017 14:02   T: 2017 14:42   MT: SV      Name:     HA ROCHA   MRN:      [id number]        Account:       [id number]   :      1940           Consult Date:  2017      Document: [id number]       cc: Andrzej Ghosh MD

## 2017-03-28 NOTE — PROGRESS NOTES
Admission medication history interview status for the 3/28/2017  admission is complete. See EPIC admission navigator for prior to admission medications     Medication history source reliability:Good    Medication history interview source(s):Patient    Medication history resources (including written lists, pill bottles, clinic record):None    Primary pharmacy[facility]    Additional medication history information not noted on PTA med list :None    Time spent in this activity: 45 minutes    Prior to Admission medications    Medication Sig Last Dose Taking? Auth Provider   ASPIRIN EC PO Take 81 mg by mouth daily 3/27/2017 at am Yes Reported, Patient   Atorvastatin Calcium (LIPITOR PO) Take 20 mg by mouth At Bedtime 3/27/2017 at pm Yes Reported, Patient   Folic Acid-Vit B6-Vit B12 (FOLBEE) 2.5-25-1 MG TABS Take 1 tablet by mouth daily 3/27/2017 at pm Yes Reported, Patient   Glimepiride (AMARYL PO) Take 2 mg by mouth daily (0.5 x 4 mg tablet = 2 mg dose) 3/27/2017 at am Yes Reported, Patient   insulin glargine (LANTUS) 100 UNIT/ML injection Inject 22 Units Subcutaneous At Bedtime (Checks BG every 2 weeks; can adjust Lantus dose +/- 2 units depending on reading that morning) 11 units on 3/27/2017 at 2200 Yes Reported, Patient   liraglutide (VICTOZA) 18 MG/3ML soln Inject 1.2 mg Subcutaneous daily 3/27/2017 at am Yes Reported, Patient   LISINOPRIL PO Take 10 mg by mouth daily 3/27/2017 at am Yes Reported, Patient   MetFORMIN HCl (GLUCOPHAGE PO) Take 1,000 mg by mouth 2 times daily (with meals) (2 x 500 mg tablet = 1000 mg dose) 3/27/2017 at pm Yes Reported, Patient   Nitroglycerin (NITROSTAT SL) Place 0.4 mg under the tongue every 5 minutes as needed for chest pain never taken at prn Yes Reported, Patient   Omeprazole (PRILOSEC PO) Take 20 mg by mouth 2 times daily (before meals) 3/27/2017 at pm Yes Reported, Patient

## 2017-03-28 NOTE — IP AVS SNAPSHOT
MRN:[id number]                      After Visit Summary   3/28/2017    Fuad Sanchez    MRN: [id number]           Thank you!     Thank you for choosing [facility] for your care. Our goal is always to provide you with excellent care. Hearing back from our patients is one way we can continue to improve our services. Please take a few minutes to complete the written survey that you may receive in the mail after you visit with us. Thank you!        Patient Information     Date Of Birth          1940        About your hospital stay     You were admitted on:  March 28, 2017 You last received care in the:  [place] [address] [facility]    You were discharged on:  March 29, 2017        Reason for your hospital stay       Minimally invasive total hip replacement                  Who to Call     For medical emergencies, please call 911.  For non-urgent questions about your medical care, please call your primary care provider or clinic, [phone]  For questions related to your surgery, please call your surgery clinic        Attending Provider     Provider Arsh Tavarez MD Orthopedics       Primary Care Provider Office Phone # Fax #    Andrzej Pepper [phone]       [facility] [address]  [facility] [address]         When to contact your care team       EMERGENCY CARE PLAN     1. I have questions or concerns during clinic hours:   - I will call the clinic directly at [phone] and and speak with Dr. Dante Barraza's care coordinator.     2. I have questions or concerns outside clinic hours:   - I will call the clinic directly at [phone] and speak with the doctor on-call.     3. I need to schedule an appointment:   - I will call the clinic directly at [phone] for [place] appointments or [phone] for [place] appointments.     4. I am concerned about symptoms that I am having and think I need same day treatment:   - During clinic  hours, I will call the clinic first at [phone] and speak with Christi.   - She will either make an appointment with Dr. Howell or she will direct you to come in to our walk-in urgent care clinic.   - The urgent care is open 7 days a week from 8:00am - 8:00pm at all of our locations.     - After clinic hours, I will call the clinic first at [phone] and speak with the on-call doctor.   - You should NOT go to the emergency room or a urgent care with out being directed to do so by the clinic.                  After Care Instructions     Activity       Your activity upon discharge: activity as tolerated.  You should work on home exercises provided by the physical therapist at the hospital 2-3 times a day.     Physical Therapy: Once you leave the hospital you will not need outpatient physical therapy for your hip.  Walking is the best exercise after a hip replacement.  Do as much walking as you feel comfortable doing  Remember, you have no hip restrictions or precautions, however you should avoid any twisting or torquing of the hip (no hokie pokie).  You should also avoid any kind of strengthening exercises of the hip and leg for the first 8 weeks after surgery.     Assistive Ambulatory Devices:  Use your walker/crutches until you feel comfortable advancing to a cane.  You should use the cane in the hand opposite your surgery.  You can then advance from the cane as you feel comfortable.     Elevate: Swelling in the leg is normal after a hip replacement.  By keeping your leg elevated with a pillow under your calf, not under the knee, will help with the swelling.  The best position is to have the knee above the level of your heart and your ankle above the level of your knee.  Wearing the compression stockings (Keith hose) can help reduce swelling.  You should wear these until you are seen at Dr. Howell's office post operatively.  You can remove these twice a day for laundering and hygiene.  The swelling in the leg  will be present for at least 4-6 weeks.       Ice: Ice the hip 4-5 times a day for 20-30 minutes at a time.  Icing will help reduce swelling and pain.     Pain control: Take the pain medication and/or anti-inflammatory medication as prescribed.  Don't let your pain become severe.            Diet       Follow this diet upon discharge: Regular            Discharge Instructions       Upon leaving the hospital you will be discharged with a few different medications:     1. Aspirin 325mg - you will be taking one tablet twice a day for 5 weeks following surgery for a blood thinner to help prevent blood clots. You should not take a baby aspirin (81mg) at the same time as this.  You can resume your baby aspirin when you have completed the 5 weeks of aspirin 325mg.  2. Oxycodone 5mg - this is a pain medication.  You can take one or two tablets every four to six hours.  You will need this medication the longest to sleep at night and for physical therapy.  You can wean yourself from this medication as you are able by either increasing the time between tablets or going down to one tablet if you are taking two at a time.   3. Senokot - this is a stool softener.  You can take one or two pills twice a day as needed for constipation.  You should take this medication as long as you are taking narcotic pain medication and as long as you do not have diarrhea.  As you are more active and taking less pain medication you will be able to stop using this.     Medication you should already have at home and to start the day after you get home from the hospital:   1. Celebrex 200mg - this is an anti-inflammatory medication you will be taking one tablet daily with your morning meal. This medication will help with the pain and swelling in your hip and leg.  You should not take another anti-inflammatory medication (i.e. Ibuprofen, advil, aleve, etc) while taking celebrex.  You can take tylenol with Celebrex.  You should have already gotten a  prescription for this medication and filled it prior to surgery.  You can start this medication the day after you get home from the hospital.     Other medications not prescribed:   1. Tylenol - you can take Tylenol in addition to the pain medication.  Do not exceed 3,000mg in a day.   2. Ibuprofen - we would like you to avoid taking ibuprofen while you are taking the aspirin.   3. Iron - we typically do not prescribe an iron supplement pill after surgery however, if you want to take one we recommend 324 or 325mg daily.  These pills will make you more constipated.     Please contact Dr. Howell's office with any questions.  You can either call Dr. Dante Barraza's , at [phone] or email Dr. Dante Beebe's physician assistant, at [email or web address].            Wound care and dressings       You have a gauze and tape dressing over the incision that you should change daily for one week.  Once you are one week out from surgery you do not need to keep a dressing over the incision.  There is a thin mesh film adhered to your skin over the incision which should stay in place until your follow-up appointment with Dr. Howell.  If this comes off you should call Dr. Howell's office for further instruction.  You can get your incision wet in the shower but you should not submerge it.                  Follow-up Appointments     Follow-up and recommended labs and tests       Your follow-up appointment is schedule for 3:30pm on Thursday 4/13 at the [place] office with Dr. Howell.  Please call [phone] if you need to reschedule this appointment.     If you have any questions prior to your follow-up appointment please call Dr. Dante Barraza's , at [phone].  You may also email Concepción with any questions at [email or web address].                  Pending Results     Date and Time Order Name Status Description    3/21/2017 0000 EKG CARDIAC - HIM SCAN Preliminary             Statement of Approval      "Ordered          17 1334  I have reviewed and agree with all the recommendations and orders detailed in this document.  EFFECTIVE NOW     Approved and electronically signed by:  Concepción Pan PA-C             Admission Information     Date & Time Provider Department Dept. Phone    3/28/2017 Arsh Ghosh MD [place] [address] [facility] [phone]      Your Vitals Were     Blood Pressure Pulse Temperature Respirations Height Weight    133/82 (BP Location: Left arm) 92 98.8  F (37.1  C) (Oral) 16 1.88 m (6' 2\") 102.5 kg (226 lb)    Pulse Oximetry BMI (Body Mass Index)                97% 29.02 kg/m2          MyChart Information     [employer] lets you send messages to your doctor, view your test results, renew your prescriptions, schedule appointments and more. To sign up, go to [email or web address] . Click on \"Log in\" on the left side of the screen, which will take you to the Welcome page. Then click on \"Sign up Now\" on the right side of the page.     You will be asked to enter the access code listed below, as well as some personal information. Please follow the directions to create your username and password.     Your access code is: [email or web address]  Expires: 2017 10:09 AM     Your access code will  in 90 days. If you need help or a new code, please call your [facility] [place] clinic or [phone].        Care EveryWhere ID     This is your Care EveryWhere ID. This could be used by other organizations to access your [facility] medical records  [id number]           Review of your medicines      START taking        Dose / Directions    oxyCODONE 5 MG IR tablet   Commonly known as:  ROXICODONE        Dose:  5-10 mg   Take 1-2 tablets (5-10 mg) by mouth every 4 hours as needed for moderate to severe pain   Quantity:  50 tablet   Refills:  0       senna-docusate 8.6-50 MG per tablet   Commonly known as:  SENOKOT-S;PERICOLACE        Dose:  1-2 tablet   Take 1-2 tablets by mouth 2 " times daily as needed for constipation   Quantity:  60 tablet   Refills:  1         CONTINUE these medicines which may have CHANGED, or have new prescriptions. If we are uncertain of the size of tablets/capsules you have at home, strength may be listed as something that might have changed.        Dose / Directions    aspirin  MG EC tablet   This may have changed:    - medication strength  - how much to take  - when to take this        Dose:  325 mg   Take 1 tablet (325 mg) by mouth 2 times daily   Quantity:  70 tablet   Refills:  0         CONTINUE these medicines which have NOT CHANGED        Dose / Directions    AMARYL PO   Notes to Patient:  Resume as per home regimen        Dose:  2 mg   Take 2 mg by mouth daily (0.5 x 4 mg tablet = 2 mg dose)   Refills:  0       FOLBEE 2.5-25-1 MG Tabs   Generic drug:  Folic Acid-Vit B6-Vit B12   Notes to Patient:  Resume as per home regimen        Dose:  1 tablet   Take 1 tablet by mouth daily   Refills:  0       GLUCOPHAGE PO   Notes to Patient:  Resume as per home regimen        Dose:  1000 mg   Take 1,000 mg by mouth 2 times daily (with meals) (2 x 500 mg tablet = 1000 mg dose)   Refills:  0       insulin glargine 100 UNIT/ML injection   Commonly known as:  LANTUS        Dose:  22 Units   Inject 22 Units Subcutaneous At Bedtime (Checks BG every 2 weeks; can adjust Lantus dose +/- 2 units depending on reading that morning)   Refills:  0       LIPITOR PO        Dose:  20 mg   Take 20 mg by mouth At Bedtime   Refills:  0       liraglutide 18 MG/3ML soln   Commonly known as:  VICTOZA   Notes to Patient:  Resume as per home regimen        Dose:  1.2 mg   Inject 1.2 mg Subcutaneous daily   Refills:  0       LISINOPRIL PO        Dose:  10 mg   Take 10 mg by mouth daily   Refills:  0       NITROSTAT SL   Notes to Patient:  Resume as per home regimen        Dose:  0.4 mg   Place 0.4 mg under the tongue every 5 minutes as needed for chest pain   Refills:  0       PRILOSEC PO         Dose:  20 mg   Take 20 mg by mouth 2 times daily (before meals)   Refills:  0            Where to get your medicines      Some of these will need a paper prescription and others can be bought over the counter. Ask your nurse if you have questions.     Bring a paper prescription for each of these medications     aspirin  MG EC tablet    oxyCODONE 5 MG IR tablet    senna-docusate 8.6-50 MG per tablet                Protect others around you: Learn how to safely use, store and throw away your medicines at www.disposemymeds.org.             Medication List: This is a list of all your medications and when to take them. Check marks below indicate your daily home schedule. Keep this list as a reference.      Medications           Morning Afternoon Evening Bedtime As Needed    AMARYL PO   Take 2 mg by mouth daily (0.5 x 4 mg tablet = 2 mg dose)   Notes to Patient:  Resume as per home regimen                                aspirin  MG EC tablet   Take 1 tablet (325 mg) by mouth 2 times daily   Next Dose Due:  Start tomorrow 3/30 am                                      FOLBEE 2.5-25-1 MG Tabs   Take 1 tablet by mouth daily   Generic drug:  Folic Acid-Vit B6-Vit B12   Notes to Patient:  Resume as per home regimen                                GLUCOPHAGE PO   Take 1,000 mg by mouth 2 times daily (with meals) (2 x 500 mg tablet = 1000 mg dose)   Notes to Patient:  Resume as per home regimen                                      insulin glargine 100 UNIT/ML injection   Commonly known as:  LANTUS   Inject 22 Units Subcutaneous At Bedtime (Checks BG every 2 weeks; can adjust Lantus dose +/- 2 units depending on reading that morning)   Last time this was given:  18 Units on 3/28/2017  9:17 PM   Next Dose Due:  Today in  pm                                   LIPITOR PO   Take 20 mg by mouth At Bedtime   Last time this was given:  20 mg on 3/28/2017  9:10 PM   Next Dose Due:  Today in pm                                    liraglutide 18 MG/3ML soln   Commonly known as:  VICTOZA   Inject 1.2 mg Subcutaneous daily   Notes to Patient:  Resume as per home regimen                                LISINOPRIL PO   Take 10 mg by mouth daily   Last time this was given:  10 mg on 3/29/2017  8:36 AM   Next Dose Due:  Tomorrow 3/30 am                                   NITROSTAT SL   Place 0.4 mg under the tongue every 5 minutes as needed for chest pain   Notes to Patient:  Resume as per home regimen                                   oxyCODONE 5 MG IR tablet   Commonly known as:  ROXICODONE   Take 1-2 tablets (5-10 mg) by mouth every 4 hours as needed for moderate to severe pain   Last time this was given:  10 mg on 3/28/2017  5:09 PM   Next Dose Due:  Anytime as needed                                   PRILOSEC PO   Take 20 mg by mouth 2 times daily (before meals)   Last time this was given:  20 mg on 3/29/2017  6:49 AM   Next Dose Due:  Today in pm                                      senna-docusate 8.6-50 MG per tablet   Commonly known as:  SENOKOT-S;PERICOLACE   Take 1-2 tablets by mouth 2 times daily as needed for constipation   Last time this was given:  1 tablet on 3/29/2017  8:36 AM   Next Dose Due:  Today in pm if needed

## 2017-03-28 NOTE — BRIEF OP NOTE
[facility] Brief Operative Note    Pre-operative diagnosis: DJD LEFT HIP   Post-operative diagnosis * No post-op diagnosis entered *     Procedure: Procedure(s):  LEFT TOTAL HIP ARTHROPLASTY ANTERIOR APPROACH (DEPUY)^ - Wound Class: I-Clean   Surgeon(s): Surgeon(s) and Role:     * Arsh Ghosh MD - Primary   Estimated blood loss: 250 mL    Specimens: * No specimens in log *   Findings: Advanced OA

## 2017-03-28 NOTE — ANESTHESIA CARE TRANSFER NOTE
Patient: Fuad Sanchez    Procedure(s):  LEFT TOTAL HIP ARTHROPLASTY ANTERIOR APPROACH (DEPUY)^ - Wound Class: I-Clean    Diagnosis: DJD LEFT HIP  Diagnosis Additional Information: No value filed.    Anesthesia Type:   General, ETT     Note:  Airway :Face Mask  Patient transferred to:PACU  Comments: Awake to pacu cooperative report to rn given dgd      Vitals: (Last set prior to Anesthesia Care Transfer)    CRNA VITALS  3/28/2017 0958 - 3/28/2017 1041      3/28/2017             Resp Rate (set): 10                Electronically Signed By: LEONA Bush CRNA  March 28, 2017  10:41 AM

## 2017-03-28 NOTE — ANESTHESIA POSTPROCEDURE EVALUATION
Patient: Fuad Sanchez    Procedure(s):  LEFT TOTAL HIP ARTHROPLASTY ANTERIOR APPROACH (DEPUY)^ - Wound Class: I-Clean    Diagnosis:DJD LEFT HIP  Diagnosis Additional Information: No value filed.    Anesthesia Type:  General, ETT    Note:  Anesthesia Post Evaluation    Patient location during evaluation: PACU  Patient participation: Able to fully participate in evaluation  Level of consciousness: awake  Pain management: adequate  Airway patency: patent  Cardiovascular status: acceptable  Respiratory status: acceptable  Hydration status: acceptable  PONV: controlled     Anesthetic complications: None          Last vitals:  Vitals:    03/28/17 1350 03/28/17 1450 03/28/17 1538   BP: 120/68 114/69 145/81   Pulse:   72   Resp: 12 16 16   Temp:   36.6  C (97.8  F)   SpO2: 98% 98% 96%         Electronically Signed By: Inessa Green MD  March 28, 2017  6:45 PM

## 2017-03-28 NOTE — PLAN OF CARE
Problem: Goal Outcome Summary  Goal: Goal Outcome Summary  Surgeon Discharge Plan: None noted in chart.     Current Functional Status: Pt modified independent with bed mobility. Pt able to perform sit to/from stand with CGA. Pt able to perform pregait activities without difficulty.      Barriers to Plan/Home: Steps to enter home (have not tried with PT).

## 2017-03-28 NOTE — ANESTHESIA PREPROCEDURE EVALUATION
Anesthesia Evaluation     . Pt has had prior anesthetic.     No history of anesthetic complications          ROS/MED HX    ENT/Pulmonary:     (+)tobacco use, Past use , . .   (-) asthma and sleep apnea   Neurologic:       Cardiovascular:     (+) Dyslipidemia, hypertension--CAD, -CABG-date: 2003, stent,2003 after CABG  . : . . . :. . Previous cardiac testing date:results:date: results: date: results:Cath date: 2007 results:Stable per LMD         (-) MUNSON   METS/Exercise Tolerance:  >4 METS   Hematologic:         Musculoskeletal: Comment: H/o back surgery  (+) arthritis, , , -       GI/Hepatic:     (+) GERD Asymptomatic on medication,       Renal/Genitourinary:         Endo:     (+) type II DM .      Psychiatric:         Infectious Disease:         Malignancy:         Other:                     Physical Exam      Airway   Mallampati: II  TM distance: >3 FB  Neck ROM: full    Dental   (+) caps and implants    Cardiovascular   Rhythm and rate: regular      Pulmonary    breath sounds clear to auscultation                    Anesthesia Plan      History & Physical Review  History and physical reviewed and following examination; no interval change.    ASA Status:  3 .        Plan for General and ETT     Additional equipment: Videolaryngoscope      Postoperative Care      Consents  Anesthetic plan, risks, benefits and alternatives discussed with:  Patient.  Use of blood products discussed: Yes.   Use of blood products discussed with Patient.  .                          .  Procedure: Procedure(s):  ARTHROPLASTY HIP ANTERIOR  Preop diagnosis: DJD LEFT HIP    Allergies   Allergen Reactions     Aspirin GI Disturbance     Past Medical History:   Diagnosis Date     Arthritis      Coronary artery disease      Diabetes (H)      Gastroesophageal reflux disease      Heart attack (H)     2003     Hypercholesterolemia      Hypertension      Stented coronary artery     CABG 2003, stents     Past Surgical History:   Procedure Laterality  Date     APPENDECTOMY       BACK SURGERY      1968     CARDIAC SURGERY       HERNIA REPAIR       TONSILLECTOMY       Prior to Admission medications    Medication Sig Start Date End Date Taking? Authorizing Provider   ASPIRIN EC PO Take 81 mg by mouth daily   Yes Reported, Patient   Atorvastatin Calcium (LIPITOR PO) Take 20 mg by mouth At Bedtime   Yes Reported, Patient   Folic Acid-Vit B6-Vit B12 (FOLBEE) 2.5-25-1 MG TABS Take 1 tablet by mouth daily   Yes Reported, Patient   Glimepiride (AMARYL PO) Take 2 mg by mouth daily (0.5 x 4 mg tablet = 2 mg dose)   Yes Reported, Patient   insulin glargine (LANTUS) 100 UNIT/ML injection Inject 22 Units Subcutaneous At Bedtime (Checks BG every 2 weeks; can adjust Lantus dose +/- 2 units depending on reading that morning)   Yes Reported, Patient   liraglutide (VICTOZA) 18 MG/3ML soln Inject 1.2 mg Subcutaneous daily   Yes Reported, Patient   LISINOPRIL PO Take 10 mg by mouth daily   Yes Reported, Patient   MetFORMIN HCl (GLUCOPHAGE PO) Take 1,000 mg by mouth 2 times daily (with meals) (2 x 500 mg tablet = 1000 mg dose)   Yes Reported, Patient   Nitroglycerin (NITROSTAT SL) Place 0.4 mg under the tongue every 5 minutes as needed for chest pain   Yes Reported, Patient   Omeprazole (PRILOSEC PO) Take 20 mg by mouth 2 times daily (before meals)   Yes Reported, Patient     Current Facility-Administered Medications Ordered in Epic   Medication Dose Route Frequency Last Rate Last Dose     ceFAZolin sodium-dextrose (ANCEF) infusion 2 g  2 g Intravenous Pre-Op/Pre-procedure x 1 dose         ceFAZolin (ANCEF) 1 g vial to attach to  ml bag for ADULT or 50 ml bag for PEDS  1 g Intravenous See Admin Instructions         tranexamic acid (CYKLOKAPRON) 1 g in NaCl 0.9 % 60 mL bolus  1 g Intravenous Once         acetaminophen (TYLENOL) tablet 1,000 mg  1,000 mg Oral Once         tranexamic acid (CYKLOKAPRON) 1 g in NaCl 0.9 % 60 mL bolus  1 g Intravenous Once         lidocaine 1 % 1 mL   1 mL Other Q1H PRN         lactated ringers infusion   Intravenous Continuous         No current Epic-ordered outpatient prescriptions on file.     Wt Readings from Last 1 Encounters:   No data found for Wt     Temp Readings from Last 1 Encounters:   No data found for Temp     BP Readings from Last 6 Encounters:   No data found for BP     Pulse Readings from Last 4 Encounters:   No data found for Pulse     Resp Readings from Last 1 Encounters:   No data found for Resp     SpO2 Readings from Last 1 Encounters:   No data found for SpO2     No results for input(s): NA, POTASSIUM, CHLORIDE, CO2, ANIONGAP, GLC, BUN, CR, RON in the last 77080 hours.  No results for input(s): WBC, HGB, PLT in the last 00814 hours.  No results for input(s): INR in the last 86293 hours.    Invalid input(s): APTT   RECENT LABS:   ECG:   ECHO:   CXR:

## 2017-03-29 ENCOUNTER — APPOINTMENT (OUTPATIENT)
Dept: PHYSICAL THERAPY | Facility: CLINIC | Age: 77
DRG: 470 | End: 2017-03-29
Attending: ORTHOPAEDIC SURGERY
Payer: COMMERCIAL

## 2017-03-29 ENCOUNTER — APPOINTMENT (OUTPATIENT)
Dept: OCCUPATIONAL THERAPY | Facility: CLINIC | Age: 77
DRG: 470 | End: 2017-03-29
Attending: ORTHOPAEDIC SURGERY
Payer: COMMERCIAL

## 2017-03-29 VITALS
WEIGHT: 226 LBS | HEIGHT: 74 IN | TEMPERATURE: 98.8 F | SYSTOLIC BLOOD PRESSURE: 133 MMHG | RESPIRATION RATE: 16 BRPM | BODY MASS INDEX: 29 KG/M2 | HEART RATE: 92 BPM | DIASTOLIC BLOOD PRESSURE: 82 MMHG | OXYGEN SATURATION: 97 %

## 2017-03-29 LAB
ANION GAP SERPL CALCULATED.3IONS-SCNC: 7 MMOL/L (ref 3–14)
BUN SERPL-MCNC: 11 MG/DL (ref 7–30)
CALCIUM SERPL-MCNC: 8 MG/DL (ref 8.5–10.1)
CHLORIDE SERPL-SCNC: 101 MMOL/L (ref 94–109)
CO2 SERPL-SCNC: 27 MMOL/L (ref 20–32)
CREAT SERPL-MCNC: 0.92 MG/DL (ref 0.66–1.25)
GFR SERPL CREATININE-BSD FRML MDRD: 80 ML/MIN/1.7M2
GLUCOSE BLDC GLUCOMTR-MCNC: 177 MG/DL (ref 70–99)
GLUCOSE BLDC GLUCOMTR-MCNC: 255 MG/DL (ref 70–99)
GLUCOSE SERPL-MCNC: 195 MG/DL (ref 70–99)
HGB BLD-MCNC: 11.9 G/DL (ref 13.3–17.7)
POTASSIUM SERPL-SCNC: 4.7 MMOL/L (ref 3.4–5.3)
SODIUM SERPL-SCNC: 135 MMOL/L (ref 133–144)

## 2017-03-29 PROCEDURE — 00000146 ZZHCL STATISTIC GLUCOSE BY METER IP

## 2017-03-29 PROCEDURE — 97110 THERAPEUTIC EXERCISES: CPT | Mod: GP

## 2017-03-29 PROCEDURE — 25000132 ZZH RX MED GY IP 250 OP 250 PS 637: Performed by: PHYSICIAN ASSISTANT

## 2017-03-29 PROCEDURE — 40000133 ZZH STATISTIC OT WARD VISIT

## 2017-03-29 PROCEDURE — 97165 OT EVAL LOW COMPLEX 30 MIN: CPT | Mod: GO

## 2017-03-29 PROCEDURE — 85018 HEMOGLOBIN: CPT | Performed by: ORTHOPAEDIC SURGERY

## 2017-03-29 PROCEDURE — 40000193 ZZH STATISTIC PT WARD VISIT

## 2017-03-29 PROCEDURE — 97530 THERAPEUTIC ACTIVITIES: CPT | Mod: GO

## 2017-03-29 PROCEDURE — 97116 GAIT TRAINING THERAPY: CPT | Mod: GP

## 2017-03-29 PROCEDURE — 36415 COLL VENOUS BLD VENIPUNCTURE: CPT | Performed by: ORTHOPAEDIC SURGERY

## 2017-03-29 PROCEDURE — 97530 THERAPEUTIC ACTIVITIES: CPT | Mod: GP

## 2017-03-29 PROCEDURE — 25000128 H RX IP 250 OP 636: Performed by: ORTHOPAEDIC SURGERY

## 2017-03-29 PROCEDURE — 25000132 ZZH RX MED GY IP 250 OP 250 PS 637: Performed by: ORTHOPAEDIC SURGERY

## 2017-03-29 PROCEDURE — 97535 SELF CARE MNGMENT TRAINING: CPT | Mod: GO

## 2017-03-29 PROCEDURE — 80048 BASIC METABOLIC PNL TOTAL CA: CPT | Performed by: ORTHOPAEDIC SURGERY

## 2017-03-29 RX ORDER — AMOXICILLIN 250 MG
1-2 CAPSULE ORAL 2 TIMES DAILY PRN
Qty: 60 TABLET | Refills: 1 | Status: SHIPPED | OUTPATIENT
Start: 2017-03-29 | End: 2022-09-06

## 2017-03-29 RX ORDER — OXYCODONE HYDROCHLORIDE 5 MG/1
5-10 TABLET ORAL EVERY 4 HOURS PRN
Qty: 50 TABLET | Refills: 0 | Status: SHIPPED | OUTPATIENT
Start: 2017-03-29 | End: 2022-09-06

## 2017-03-29 RX ADMIN — INSULIN ASPART 2 UNITS: 100 INJECTION, SOLUTION INTRAVENOUS; SUBCUTANEOUS at 08:03

## 2017-03-29 RX ADMIN — ACETAMINOPHEN 975 MG: 325 TABLET, FILM COATED ORAL at 06:49

## 2017-03-29 RX ADMIN — SENNOSIDES AND DOCUSATE SODIUM 1 TABLET: 8.6; 5 TABLET ORAL at 08:36

## 2017-03-29 RX ADMIN — CEFAZOLIN SODIUM 2 G: 2 INJECTION, SOLUTION INTRAVENOUS at 01:27

## 2017-03-29 RX ADMIN — ACETAMINOPHEN 975 MG: 325 TABLET, FILM COATED ORAL at 13:17

## 2017-03-29 RX ADMIN — ENOXAPARIN SODIUM 40 MG: 40 INJECTION SUBCUTANEOUS at 08:36

## 2017-03-29 RX ADMIN — OMEPRAZOLE 20 MG: 20 CAPSULE, DELAYED RELEASE ORAL at 06:49

## 2017-03-29 RX ADMIN — INSULIN ASPART 3 UNITS: 100 INJECTION, SOLUTION INTRAVENOUS; SUBCUTANEOUS at 13:18

## 2017-03-29 RX ADMIN — KETOROLAC TROMETHAMINE 15 MG: 15 INJECTION, SOLUTION INTRAMUSCULAR; INTRAVENOUS at 04:46

## 2017-03-29 RX ADMIN — LISINOPRIL 10 MG: 10 TABLET ORAL at 08:36

## 2017-03-29 ASSESSMENT — ACTIVITIES OF DAILY LIVING (ADL): PREVIOUS_RESPONSIBILITIES: MEAL PREP;HOUSEKEEPING;YARDWORK;MEDICATION MANAGEMENT;FINANCES;DRIVING;WORK

## 2017-03-29 NOTE — PLAN OF CARE
Problem: Goal Outcome Summary  Goal: Goal Outcome Summary  Surgeon Discharge Plan: Per chart, anticipate discharge home today.     Current Functional Status: Pt SBA for supine to/from sit transition. Pt performed sit to/from stand transfers with SBA to FWW. Pt able to ambulate 130' with FWW with SBA. Able to ascend/descend 3 steps with CGA and cues for sequencing.     Barriers to Plan/Home: 2 steps to enter home.

## 2017-03-29 NOTE — PLAN OF CARE
Problem: Goal Outcome Summary  Goal: Goal Outcome Summary  Physical Therapy Discharge Summary     Reason for therapy discharge:    Discharged to home.     Progress towards therapy goal(s). See goals on Care Plan in [facility] electronic health record for goal details.  Goals partially met.  Barriers to achieving goals:   discharge from facility.     Therapy recommendation(s):    Continue home exercise program.

## 2017-03-29 NOTE — PROGRESS NOTES
"Orthopedic Surgery  3/29/2017  POD #1    S: Patient voices no complaints today.     O: Blood pressure 132/73, pulse 107, temperature 99.6  F (37.6  C), temperature source Oral, resp. rate 16, height 1.88 m (6' 2\"), weight 102.5 kg (226 lb), SpO2 94 %.  Lab Results   Component Value Date    HGB 11.9 03/29/2017     Neurovascularly intact.  Calves are negative bilaterally, both soft and nontender.  The dressing is C/D/I.      A: Mr. Sanchez is doing well status post Procedure(s):  ARTHROPLASTY HIP ANTERIOR.    P: Continue physical therapy. Anticipate discharge to home today.    Arsh Howell  [phone]    "

## 2017-03-29 NOTE — PLAN OF CARE
Problem: Goal Outcome Summary  Goal: Goal Outcome Summary  Outcome: Completed Date Met:  03/29/17  Pt A&Ox4, VSS, CMS intact, pt up with SBA and walker, voiding well, tylenol for pain management, tolerating diabetic diet, bg 195,255 today, dressing changed, incision C,D,I with mesh. Pt discharged to home with spouse. Pt verbalizes understanding of discharge instructions/medications/follow up plan. Pt spouse present during education.

## 2017-03-29 NOTE — PROGRESS NOTES
03/29/17 0944   Quick Adds   Type of Visit Initial Occupational Therapy Evaluation   Living Environment   Lives With spouse   Living Arrangements house   Home Accessibility stairs to enter home   Number of Stairs to Enter Home 2   Number of Stairs Within Home 0   Stair Railings at Home none   Transportation Available car;family or friend will provide   Living Environment Comment Has standard toilet, no grab bars. Walk in shower with no chair, no grab bars. Has friend with chair that could loan one.    Self-Care   Usual Activity Tolerance good   Regular Exercise yes   Activity/Exercise Type walking   Exercise Amount/Frequency daily   Equipment Currently Used at Home walker, standard   Activity/Exercise/Self-Care Comment Patient manages own medications, drives, works in farm management (office work/farmer).    Functional Level Prior   Ambulation 0-->independent   Transferring 0-->independent   Toileting 0-->independent   Bathing 0-->independent   Dressing 0-->independent   Eating 0-->independent   Communication 0-->understands/communicates without difficulty   Cognition 0 - no cognition issues reported   Fall history within last six months no   Which of the above functional risks had a recent onset or change? none   Prior Functional Level Comment Pt reports independence at baseline. 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 Wife is able to assist as needed.    General Information   Onset of Illness/Injury or Date of Surgery - Date 03/28/17   Referring Physician Dr. Howell   Patient/Family Goals Statement Would like to go home   Additional Occupational Profile Info/Pertinent History of Current Problem Patient is POD#1 Left Anterior Total Hip Replacement, no hip precautions.    Precautions/Limitations fall precautions   Weight-Bearing Status - LLE weight-bearing as tolerated   Cognitive Status Examination   Orientation orientation to person, place and time   Level of Consciousness alert    Cognitive Comment No cognitive deficits identified.   Visual Perception   Visual Perception Wears glasses   Sensory Examination   Sensory Comments No numbness/tingling reported in upper or lower extremities.   Pain Assessment   Patient Currently in Pain No   Posture   Posture forward head position;protracted shoulders   Range of Motion (ROM)   ROM Comment Bilateral UE WNL.   Strength   Strength Comments Bilateral deltoids 5/5. Bilateral biceps 5/5. Bilateral finger flexors 5/5.    Coordination   Upper Extremity Coordination No deficits were identified   Mobility   Bed Mobility Comments SB A using bedrail from sit > supine.   Transfer Skill: Sit to Stand   Level of [place]: Sit/Stand stand-by assist   Assistive Device for Transfer: Sit/Stand rolling walker  (FWW)   Toilet Transfer   Toilet Transfer Comments CG A to [facility] over toilet.   Balance   Balance Comments CG A for transfers, SB A to ambulate.   Lower Body Dressing   Level of [place]: Dress Lower Body dependent (less than 25% patients effort)  (socks at EOB, limited by pain/decreased ROM.)   Grooming   Level of [place]: Grooming stand-by assist  (standing at sink.)   Instrumental Activities of Daily Living (IADL)   Previous Responsibilities meal prep;housekeeping;yardwork;medication management;finances;driving;work   Activities of Daily Living Analysis   Impairments Contributing to Impaired Activities of Daily Living balance impaired;pain;ROM decreased   General Therapy Interventions   Planned Therapy Interventions ADL retraining;IADL retraining;transfer training   Clinical Impression   Criteria for Skilled Therapeutic Interventions Met yes, treatment indicated   OT Diagnosis Reduced participation in ADLs.   Influenced by the following impairments balance impaired;pain;ROM decreased   Assessment of Occupational Performance 1-3 Performance Deficits   Identified Performance Deficits difficulty dressing, toilet transfer.   Clinical Decision Making  "(Complexity) Low complexity   Therapy Frequency daily   Predicted Duration of Therapy Intervention (days/wks) 2   Anticipated Equipment Needs at Discharge shower chair;raised toilet seat   Anticipated Discharge Disposition Home with Assist   Risks and Benefits of Treatment have been explained. Yes   Patient, Family & other staff in agreement with plan of care Yes   [facility] TM \"6 Clicks\"   2016, Trustees of [facility], under license to [employer].  All rights reserved.   6 Clicks Short Forms Basic Mobility Inpatient Short Form   Total Evaluation Time   Total Evaluation Time (Minutes) 17     "

## 2017-03-29 NOTE — PLAN OF CARE
Problem: Goal Outcome Summary  Goal: Goal Outcome Summary  Outcome: Improving  Patient is A&O, VSS on RA, CMS intact, mod carb diet, up with assist of 1 and walker, BG checks. Sanchez d/c- due to void, IV saline locked, on scheduled Toradol and tylenol, refuses to take oxycodone and stated that pain is manageable.  He is progressing per plan of care

## 2017-03-29 NOTE — PLAN OF CARE
Problem: Goal Outcome Summary  Goal: Goal Outcome Summary  Outcome: Improving  Pain well controlled with IV Dilaudid and Oxycodone, dressing clean, dry and intact, CMS is WNL, VSS, taking food and fluids without nausea. Sanchez  intact and patent. Got OOB and tolerated well with PT.

## 2017-03-29 NOTE — PLAN OF CARE
"Problem: Goal Outcome Summary  Goal: Goal Outcome Summary  Surgeon Discharge Plan: Home today.            Current Functional Status: SB A sit<> stand and to ambulate, CG A to BSC over toilet, dependent to dress socks however has spouse assist at home.             Barriers to Plan/Home: Patient has \"higher\" toilet however nothing to grab onto on either side. Patient looking into if they are able to get one from friend, or if they would like raised toilet with grab bars issued here.               Occupational Therapy Discharge Summary    Reason for therapy discharge:    Discharged to home.    Progress towards therapy goal(s). See goals on Care Plan in [facility] electronic health record for goal details.  Goals not met.  Barriers to achieving goals:   discharge from facility.    Therapy recommendation(s):    No further therapy is recommended.      "

## 2017-04-01 NOTE — DISCHARGE SUMMARY
"Discharge Summary    Fuad Sanchez MRN# [id number]   YOB: 1940 Age: 76 year old     Date of Admission: 3/28/2017    Date of Discharge: 3/29/2017    Reason for Admission: Fuad Sanchez is an 76 year old male who was admitted to the hospital following surgery with Dr. Arsh Howell.    Preoperative Diagnosis: DJD LEFT HIP    Postoperative Diagnosis: DJD LEFT HIP    Procedure Completed: left total hip arthroplasty     Hospital Course:  Mr. Sanchez was admitted and underwent the above procedure. The patient tolerated the procedure well. There were no complications. Following surgery he was admitted to the floor.  During his stay he did not require any blood transfusions.  His last hemoglobin was noted to be   Lab Results   Component Value Date    HGB 11.9 03/29/2017   .  His pain was controlled with oral pain medication.  During his stay he progressed well in physical therapy and all the therapy goals were met.     Discharge Physical Exam:  /82 (BP Location: Left arm)  Pulse 92  Temp 98.8  F (37.1  C) (Oral)  Resp 16  Ht 1.88 m (6' 2\")  Wt 102.5 kg (226 lb)  SpO2 97%  BMI 29.02 kg/m2  Neurovascularly intact, distal pulses present bilaterally.  Calves are negative bilaterally, both soft and nontender.    Assessment: Mr. Sanchez is stable and doing well status post left total hip arthroplasty on POD #1.    Plan: We will discharge him home on analgesics and deep venous thrombosis prophylaxis.  He will follow-up with Dr. Arsh Howell approximately 2 weeks from surgery.  He may call [phone] to schedule an appointment.      Meds:   Fuad Sanchez   Home Medication Instructions DAISY:[id number]    Printed on:04/01/17 1122   Medication Information                      aspirin  MG EC tablet  Take 1 tablet (325 mg) by mouth 2 times daily             Atorvastatin Calcium (LIPITOR PO)  Take 20 mg by mouth At Bedtime             Folic Acid-Vit B6-Vit B12 (FOLBEE) 2.5-25-1 MG TABS  Take 1 " tablet by mouth daily             Glimepiride (AMARYL PO)  Take 2 mg by mouth daily (0.5 x 4 mg tablet = 2 mg dose)             insulin glargine (LANTUS) 100 UNIT/ML injection  Inject 22 Units Subcutaneous At Bedtime (Checks BG every 2 weeks; can adjust Lantus dose +/- 2 units depending on reading that morning)             liraglutide (VICTOZA) 18 MG/3ML soln  Inject 1.2 mg Subcutaneous daily             LISINOPRIL PO  Take 10 mg by mouth daily             MetFORMIN HCl (GLUCOPHAGE PO)  Take 1,000 mg by mouth 2 times daily (with meals) (2 x 500 mg tablet = 1000 mg dose)             Nitroglycerin (NITROSTAT SL)  Place 0.4 mg under the tongue every 5 minutes as needed for chest pain             Omeprazole (PRILOSEC PO)  Take 20 mg by mouth 2 times daily (before meals)             oxyCODONE (ROXICODONE) 5 MG IR tablet  Take 1-2 tablets (5-10 mg) by mouth every 4 hours as needed for moderate to severe pain             senna-docusate (SENOKOT-S;PERICOLACE) 8.6-50 MG per tablet  Take 1-2 tablets by mouth 2 times daily as needed for constipation

## 2020-06-09 ENCOUNTER — AMBULATORY - HEALTHEAST (OUTPATIENT)
Dept: SURGERY | Facility: CLINIC | Age: 80
End: 2020-06-09

## 2020-06-09 DIAGNOSIS — Z11.59 ENCOUNTER FOR SCREENING FOR OTHER VIRAL DISEASES: ICD-10-CM

## 2020-06-10 ENCOUNTER — RECORDS - HEALTHEAST (OUTPATIENT)
Dept: ADMINISTRATIVE | Facility: OTHER | Age: 80
End: 2020-06-10

## 2020-07-21 ENCOUNTER — AMBULATORY - HEALTHEAST (OUTPATIENT)
Dept: MULTI SPECIALTY CLINIC | Facility: CLINIC | Age: 80
End: 2020-07-21

## 2020-07-21 LAB
CREAT SERPL-MCNC: 1.03 MG/DL (ref 0.72–1.25)
GFR ESTIMATE EXT - HISTORICAL: >60 ML/MIN/1.73M2
GFR ESTIMATE, IF BLACK EXT - HISTORICAL: >60 ML/MIN/1.73M2

## 2020-07-24 DIAGNOSIS — Z11.59 SPECIAL SCREENING EXAMINATION FOR VIRAL DISEASE: Primary | ICD-10-CM

## 2020-07-27 DIAGNOSIS — Z11.59 SPECIAL SCREENING EXAMINATION FOR VIRAL DISEASE: ICD-10-CM

## 2020-07-27 PROCEDURE — U0003 INFECTIOUS AGENT DETECTION BY NUCLEIC ACID (DNA OR RNA); SEVERE ACUTE RESPIRATORY SYNDROME CORONAVIRUS 2 (SARS-COV-2) (CORONAVIRUS DISEASE [COVID-19]), AMPLIFIED PROBE TECHNIQUE, MAKING USE OF HIGH THROUGHPUT TECHNOLOGIES AS DESCRIBED BY CMS-2020-01-R: HCPCS | Performed by: PEDIATRICS

## 2020-07-27 ASSESSMENT — MIFFLIN-ST. JEOR: SCORE: 1818.95

## 2020-07-28 LAB
SARS-COV-2 RNA SPEC QL NAA+PROBE: NOT DETECTED
SPECIMEN SOURCE: NORMAL

## 2020-07-29 ENCOUNTER — ANESTHESIA - HEALTHEAST (OUTPATIENT)
Dept: SURGERY | Facility: CLINIC | Age: 80
End: 2020-07-29

## 2020-07-29 ENCOUNTER — SURGERY - HEALTHEAST (OUTPATIENT)
Dept: SURGERY | Facility: CLINIC | Age: 80
End: 2020-07-29

## 2020-07-29 ASSESSMENT — MIFFLIN-ST. JEOR: SCORE: 1793.15

## 2020-07-30 ASSESSMENT — MIFFLIN-ST. JEOR: SCORE: 1793.15

## 2021-06-04 VITALS — WEIGHT: 222.31 LBS | HEIGHT: 74 IN | BODY MASS INDEX: 28.53 KG/M2

## 2021-06-10 NOTE — ANESTHESIA PREPROCEDURE EVALUATION
Anesthesia Evaluation      Patient summary reviewed   No history of anesthetic complications     Airway   Mallampati: II  Neck ROM: full   Pulmonary     breath sounds clear to auscultation  (+) a smoker  (-) pneumonia, asthma, shortness of breath, recent URI, sleep apnea                         Cardiovascular   Exercise tolerance: > or = 4 METS  (+) hypertension, CAD, CABG/stent, CHF (EF 50-55%), , hypercholesterolemia,     (-) angina  ECG reviewed  Rhythm: regular  Rate: normal,         Neuro/Psych    (+) chronic pain  (-) no CVA    Endo/Other    (+) diabetes mellitus,      GI/Hepatic/Renal       Other findings: Stopped asa 7 days ago. Took plavix thru Monday 7/27. Surgeon aware , OK to proceed      Dental    (+) caps                       Anesthesia Plan  Planned anesthetic: general LMA  saphenous AC and sciatic popliteal blocks for postop pain   ASA 3   Induction: intravenous   Anesthetic plan and risks discussed with: patient  Anesthesia plan special considerations: antiemetics,   Post-op plan: routine recovery

## 2021-06-10 NOTE — ANESTHESIA CARE TRANSFER NOTE
Last vitals:   Vitals:    07/29/20 1441   BP: 142/84   Pulse: 87   Resp: 14   Temp: 36.3  C (97.4  F)   SpO2: 96%     Patient's level of consciousness is awake  Spontaneous respirations: yes  Maintains airway independently: yes  Dentition unchanged: yes  Oropharynx: oropharynx clear of all foreign objects    QCDR Measures:  ASA# 20 - Surgical Safety Checklist: WHO surgical safety checklist completed prior to induction    PQRS# 430 - Adult PONV Prevention: 4558F - Pt received => 2 anti-emetic agents (different classes) preop & intraop  ASA# 8 - Peds PONV Prevention: NA - Not pediatric patient, not GA or 2 or more risk factors NOT present  PQRS# 424 - Nancy-op Temp Management: 4559F - At least one body temp DOCUMENTED => 35.5C or 95.9F within required timeframe  PQRS# 426 - PACU Transfer Protocol: - Transfer of care checklist used  ASA# 14 - Acute Post-op Pain: ASA14B - Patient did NOT experience pain >= 7 out of 10

## 2021-06-10 NOTE — ANESTHESIA POSTPROCEDURE EVALUATION
Patient: Fuad Sanchez  Procedure(s):  RIGHT ANKLE ARTHRODESIS (Right)  Anesthesia type: general    Patient location: PACU  Last vitals:   Vitals Value Taken Time   /70 7/29/2020  3:45 PM   Temp 36.4  C (97.6  F) 7/29/2020  3:45 PM   Pulse 85 7/29/2020  3:45 PM   Resp 16 7/29/2020  3:45 PM   SpO2 94 % 7/29/2020  3:45 PM     Post vital signs: stable  Level of consciousness: awake and responds to simple questions  Post-anesthesia pain: pain controlled  Post-anesthesia nausea and vomiting: no  Pulmonary: unassisted, return to baseline  Cardiovascular: stable and blood pressure at baseline  Hydration: adequate  Anesthetic events: no    QCDR Measures:  ASA# 11 - Nancy-op Cardiac Arrest: ASA11B - Patient did NOT experience unanticipated cardiac arrest  ASA# 12 - Nancy-op Mortality Rate: ASA12B - Patient did NOT die  ASA# 13 - PACU Re-Intubation Rate: ASA13B - Patient did NOT require a new airway mgmt  ASA# 10 - Composite Anes Safety: ASA10A - No serious adverse event    Additional Notes:

## 2021-07-03 NOTE — ADDENDUM NOTE
Addendum Note by Nakia Garcia RN at 6/9/2020  3:18 PM     Author: Nakia Garcia RN Service: -- Author Type: Registered Nurse    Filed: 7/15/2020  2:50 PM Encounter Date: 6/9/2020 Status: Signed    : Nakia Garcia RN (Registered Nurse)    Addended by: NAKIA GARCIA on: 7/15/2020 02:50 PM        Modules accepted: Orders

## 2022-08-08 NOTE — PROGRESS NOTES
Pre-op Total Joint Patient Screening    1. Do you have a ride available to come to the hospital the day after your surgery by 8am with anticipated discharge of 11am? Y  2. What is the name of this person? Shanel (spouse)  3. Do you have a  set up after surgery? Y  4. Will your  be the same person that gives you a ride home after surgery? Y  5. Have you received the Joint Replacement Guidebook? Y  6. Do you have any questions about your guidebook? N  7. Have you activated your [employer] account? N  8. Have you signed up for MY Chart access? N

## 2022-08-30 ENCOUNTER — TRANSFERRED RECORDS (OUTPATIENT)
Dept: MULTI SPECIALTY CLINIC | Facility: CLINIC | Age: 82
End: 2022-08-30

## 2022-08-30 LAB
CREATININE (EXTERNAL): 1 MG/DL (ref 0.7–1.2)
GFR ESTIMATED (EXTERNAL): >60 ML/MIN/1.73M2
GLUCOSE (EXTERNAL): 97 MG/DL (ref 70–100)
POTASSIUM (EXTERNAL): 5.3 MMOL/L (ref 3.5–5.1)

## 2022-09-06 RX ORDER — CLOPIDOGREL BISULFATE 75 MG/1
75 TABLET ORAL DAILY
COMMUNITY

## 2022-09-06 RX ORDER — ACETAMINOPHEN 500 MG
500-1000 TABLET ORAL PRN
Status: ON HOLD | COMMUNITY
End: 2022-09-08

## 2022-09-06 RX ORDER — AMOXICILLIN 500 MG/1
2000 CAPSULE ORAL PRN
COMMUNITY

## 2022-09-06 NOTE — PROGRESS NOTES
PTA medications updated by Medication Scribe prior to surgery via phone call with patient (last doses completed by Nurse)     Medication history sources: Patient and H&P  In the past week, patient estimated taking medication this percent of the time: Greater than 90%  Adherence assessment: N/A Not Observed    Significant changes made to the medication list:  Patient reports no longer taking the following meds (med scribe removed from PTA med list): Oxycodone, senokot      Additional medication history information:   None    Medication reconciliation completed by provider prior to medication history? No    Time spent in this activity: 75 minutes    The information provided in this note is only as accurate as the sources available at the time of update(s)      Prior to Admission medications    Medication Sig Last Dose Taking? Auth Provider Long Term End Date   acetaminophen (TYLENOL) 500 MG tablet Take 500-1,000 mg by mouth as needed for mild pain 9/4/2022 at AM Yes Reported, Patient     amoxicillin (AMOXIL) 500 MG capsule Take 2,000 mg by mouth as needed (4 x 500 mg= 2000 mg  Once PRN one hour prior to dental procedures) PRN Yes Reported, Patient     Atorvastatin Calcium (LIPITOR PO) Take 20 mg by mouth At Bedtime  at HS Yes Reported, Patient Yes    clopidogrel (PLAVIX) 75 MG tablet Take 75 mg by mouth daily 9/2/2022 at Unknown Yes Reported, Patient Yes    glimepiride (AMARYL) 2 MG tablet Take 2 mg by mouth every morning 9/6/2022 at AM Yes Reported, Patient Yes    insulin glargine (LANTUS) 100 UNIT/ML injection Inject 2 Units Subcutaneous At Bedtime 9/5/2022 at HS Yes Reported, Patient Yes    liraglutide (VICTOZA) 18 MG/3ML soln Inject 1.2 mg Subcutaneous every morning 9/6/2022 at AM Yes Reported, Patient Yes    LISINOPRIL PO Take 10 mg by mouth every morning 9/6/2022 at AM Yes Reported, Patient Yes    metFORMIN (GLUCOPHAGE) 500 MG tablet Take 500 mg by mouth 2 times daily (with meals)  at PM Yes Reported, Patient  Yes    Nitroglycerin (NITROSTAT SL) Place 0.4 mg under the tongue every 5 minutes as needed for chest pain PRN Yes Reported, Patient Yes    Omeprazole (PRILOSEC PO) Take 20 mg by mouth every morning 9/6/2022 at AM Yes Reported, Patient

## 2022-09-07 ENCOUNTER — ANESTHESIA (OUTPATIENT)
Dept: SURGERY | Facility: CLINIC | Age: 82
End: 2022-09-07
Payer: COMMERCIAL

## 2022-09-07 ENCOUNTER — APPOINTMENT (OUTPATIENT)
Dept: GENERAL RADIOLOGY | Facility: CLINIC | Age: 82
End: 2022-09-07
Attending: ORTHOPAEDIC SURGERY
Payer: COMMERCIAL

## 2022-09-07 ENCOUNTER — ANESTHESIA EVENT (OUTPATIENT)
Dept: SURGERY | Facility: CLINIC | Age: 82
End: 2022-09-07
Payer: COMMERCIAL

## 2022-09-07 ENCOUNTER — APPOINTMENT (OUTPATIENT)
Dept: PHYSICAL THERAPY | Facility: CLINIC | Age: 82
End: 2022-09-07
Attending: ORTHOPAEDIC SURGERY
Payer: COMMERCIAL

## 2022-09-07 ENCOUNTER — HOSPITAL ENCOUNTER (OUTPATIENT)
Facility: CLINIC | Age: 82
Discharge: HOME | End: 2022-09-08
Attending: ORTHOPAEDIC SURGERY | Admitting: ORTHOPAEDIC SURGERY
Payer: COMMERCIAL

## 2022-09-07 DIAGNOSIS — Z96.641 STATUS POST TOTAL HIP REPLACEMENT, RIGHT: Primary | ICD-10-CM

## 2022-09-07 LAB
CREAT SERPL-MCNC: 0.96 MG/DL (ref 0.66–1.25)
FASTING STATUS PATIENT QL REPORTED: YES
GFR SERPL CREATININE-BSD FRML MDRD: 80 ML/MIN/1.73M2
GLUCOSE BLD-MCNC: 140 MG/DL (ref 70–99)
GLUCOSE BLDC GLUCOMTR-MCNC: 204 MG/DL (ref 70–99)
GLUCOSE BLDC GLUCOMTR-MCNC: 222 MG/DL (ref 70–99)
GLUCOSE BLDC GLUCOMTR-MCNC: 223 MG/DL (ref 70–99)
GLUCOSE BLDC GLUCOMTR-MCNC: 264 MG/DL (ref 70–99)
HBA1C MFR BLD: 6.3 % (ref 0–5.6)
POTASSIUM BLD-SCNC: 4.5 MMOL/L (ref 3.4–5.3)

## 2022-09-07 PROCEDURE — 250N000011 HC RX IP 250 OP 636: Performed by: ANESTHESIOLOGY

## 2022-09-07 PROCEDURE — 250N000009 HC RX 250: Performed by: STUDENT IN AN ORGANIZED HEALTH CARE EDUCATION/TRAINING PROGRAM

## 2022-09-07 PROCEDURE — 97530 THERAPEUTIC ACTIVITIES: CPT | Mod: GP

## 2022-09-07 PROCEDURE — 250N000012 HC RX MED GY IP 250 OP 636 PS 637: Performed by: NURSE PRACTITIONER

## 2022-09-07 PROCEDURE — 999N000179 XR SURGERY CARM FLUORO LESS THAN 5 MIN W STILLS

## 2022-09-07 PROCEDURE — C1713 ANCHOR/SCREW BN/BN,TIS/BN: HCPCS | Performed by: ORTHOPAEDIC SURGERY

## 2022-09-07 PROCEDURE — 370N000017 HC ANESTHESIA TECHNICAL FEE, PER MIN: Performed by: ORTHOPAEDIC SURGERY

## 2022-09-07 PROCEDURE — 999N000063 XR PELVIS AND HIP PORTABLE RIGHT 1 VIEW

## 2022-09-07 PROCEDURE — 36415 COLL VENOUS BLD VENIPUNCTURE: CPT | Performed by: ANESTHESIOLOGY

## 2022-09-07 PROCEDURE — 82962 GLUCOSE BLOOD TEST: CPT

## 2022-09-07 PROCEDURE — 999N000141 HC STATISTIC PRE-PROCEDURE NURSING ASSESSMENT: Performed by: ORTHOPAEDIC SURGERY

## 2022-09-07 PROCEDURE — 258N000003 HC RX IP 258 OP 636

## 2022-09-07 PROCEDURE — 710N000009 HC RECOVERY PHASE 1, LEVEL 1, PER MIN: Performed by: ORTHOPAEDIC SURGERY

## 2022-09-07 PROCEDURE — 250N000009 HC RX 250: Performed by: ANESTHESIOLOGY

## 2022-09-07 PROCEDURE — 250N000011 HC RX IP 250 OP 636: Performed by: STUDENT IN AN ORGANIZED HEALTH CARE EDUCATION/TRAINING PROGRAM

## 2022-09-07 PROCEDURE — 272N000001 HC OR GENERAL SUPPLY STERILE: Performed by: ORTHOPAEDIC SURGERY

## 2022-09-07 PROCEDURE — 250N000013 HC RX MED GY IP 250 OP 250 PS 637: Performed by: STUDENT IN AN ORGANIZED HEALTH CARE EDUCATION/TRAINING PROGRAM

## 2022-09-07 PROCEDURE — 258N000003 HC RX IP 258 OP 636: Performed by: ANESTHESIOLOGY

## 2022-09-07 PROCEDURE — 250N000013 HC RX MED GY IP 250 OP 250 PS 637: Performed by: NURSE PRACTITIONER

## 2022-09-07 PROCEDURE — 82947 ASSAY GLUCOSE BLOOD QUANT: CPT | Performed by: ANESTHESIOLOGY

## 2022-09-07 PROCEDURE — 84132 ASSAY OF SERUM POTASSIUM: CPT | Performed by: ANESTHESIOLOGY

## 2022-09-07 PROCEDURE — 250N000009 HC RX 250: Performed by: ORTHOPAEDIC SURGERY

## 2022-09-07 PROCEDURE — 97161 PT EVAL LOW COMPLEX 20 MIN: CPT | Mod: GP

## 2022-09-07 PROCEDURE — 99221 1ST HOSP IP/OBS SF/LOW 40: CPT | Performed by: NURSE PRACTITIONER

## 2022-09-07 PROCEDURE — 360N000084 HC SURGERY LEVEL 4 W/ FLUORO, PER MIN: Performed by: ORTHOPAEDIC SURGERY

## 2022-09-07 PROCEDURE — 258N000001 HC RX 258: Performed by: ORTHOPAEDIC SURGERY

## 2022-09-07 PROCEDURE — 96372 THER/PROPH/DIAG INJ SC/IM: CPT | Performed by: NURSE PRACTITIONER

## 2022-09-07 PROCEDURE — 250N000011 HC RX IP 250 OP 636: Performed by: ORTHOPAEDIC SURGERY

## 2022-09-07 PROCEDURE — 83036 HEMOGLOBIN GLYCOSYLATED A1C: CPT | Performed by: NURSE PRACTITIONER

## 2022-09-07 PROCEDURE — 36415 COLL VENOUS BLD VENIPUNCTURE: CPT | Performed by: NURSE PRACTITIONER

## 2022-09-07 PROCEDURE — C1776 JOINT DEVICE (IMPLANTABLE): HCPCS | Performed by: ORTHOPAEDIC SURGERY

## 2022-09-07 PROCEDURE — 250N000025 HC SEVOFLURANE, PER MIN: Performed by: ORTHOPAEDIC SURGERY

## 2022-09-07 PROCEDURE — 258N000003 HC RX IP 258 OP 636: Performed by: STUDENT IN AN ORGANIZED HEALTH CARE EDUCATION/TRAINING PROGRAM

## 2022-09-07 PROCEDURE — 250N000012 HC RX MED GY IP 250 OP 636 PS 637: Performed by: ANESTHESIOLOGY

## 2022-09-07 PROCEDURE — 250N000013 HC RX MED GY IP 250 OP 250 PS 637: Performed by: ORTHOPAEDIC SURGERY

## 2022-09-07 PROCEDURE — 82565 ASSAY OF CREATININE: CPT | Performed by: ORTHOPAEDIC SURGERY

## 2022-09-07 DEVICE — IMPLANTABLE DEVICE: Type: IMPLANTABLE DEVICE | Site: HIP | Status: FUNCTIONAL

## 2022-09-07 DEVICE — IMP SCR BONE CAN ACE 6.5X30MM 1217-30-500: Type: IMPLANTABLE DEVICE | Site: HIP | Status: FUNCTIONAL

## 2022-09-07 DEVICE — IMP HEAD FEMORAL DEPUY CERAMIC 36MM +5MM 136536320: Type: IMPLANTABLE DEVICE | Site: HIP | Status: FUNCTIONAL

## 2022-09-07 DEVICE — IMP SCR BONE CAN ACE 6.5X35MM 1217-35-500: Type: IMPLANTABLE DEVICE | Site: HIP | Status: FUNCTIONAL

## 2022-09-07 RX ORDER — SODIUM CHLORIDE, SODIUM LACTATE, POTASSIUM CHLORIDE, CALCIUM CHLORIDE 600; 310; 30; 20 MG/100ML; MG/100ML; MG/100ML; MG/100ML
INJECTION, SOLUTION INTRAVENOUS CONTINUOUS PRN
Status: DISCONTINUED | OUTPATIENT
Start: 2022-09-07 | End: 2022-09-07

## 2022-09-07 RX ORDER — MAGNESIUM HYDROXIDE 1200 MG/15ML
LIQUID ORAL PRN
Status: DISCONTINUED | OUTPATIENT
Start: 2022-09-07 | End: 2022-09-07 | Stop reason: HOSPADM

## 2022-09-07 RX ORDER — ONDANSETRON 2 MG/ML
4 INJECTION INTRAMUSCULAR; INTRAVENOUS EVERY 30 MIN PRN
Status: DISCONTINUED | OUTPATIENT
Start: 2022-09-07 | End: 2022-09-07

## 2022-09-07 RX ORDER — ONDANSETRON 4 MG/1
4 TABLET, ORALLY DISINTEGRATING ORAL EVERY 30 MIN PRN
Status: DISCONTINUED | OUTPATIENT
Start: 2022-09-07 | End: 2022-09-07

## 2022-09-07 RX ORDER — ACETAMINOPHEN 325 MG/1
975 TABLET ORAL EVERY 8 HOURS
Status: DISCONTINUED | OUTPATIENT
Start: 2022-09-07 | End: 2022-09-08 | Stop reason: HOSPADM

## 2022-09-07 RX ORDER — CEFAZOLIN SODIUM/WATER 2 G/20 ML
2 SYRINGE (ML) INTRAVENOUS SEE ADMIN INSTRUCTIONS
Status: DISCONTINUED | OUTPATIENT
Start: 2022-09-07 | End: 2022-09-07 | Stop reason: HOSPADM

## 2022-09-07 RX ORDER — CEFAZOLIN SODIUM/WATER 2 G/20 ML
2 SYRINGE (ML) INTRAVENOUS
Status: COMPLETED | OUTPATIENT
Start: 2022-09-07 | End: 2022-09-07

## 2022-09-07 RX ORDER — NALOXONE HYDROCHLORIDE 0.4 MG/ML
0.2 INJECTION, SOLUTION INTRAMUSCULAR; INTRAVENOUS; SUBCUTANEOUS
Status: DISCONTINUED | OUTPATIENT
Start: 2022-09-07 | End: 2022-09-08 | Stop reason: HOSPADM

## 2022-09-07 RX ORDER — LIDOCAINE HYDROCHLORIDE 20 MG/ML
INJECTION, SOLUTION INFILTRATION; PERINEURAL PRN
Status: DISCONTINUED | OUTPATIENT
Start: 2022-09-07 | End: 2022-09-07

## 2022-09-07 RX ORDER — VANCOMYCIN HYDROCHLORIDE 1 G/20ML
INJECTION, POWDER, LYOPHILIZED, FOR SOLUTION INTRAVENOUS PRN
Status: DISCONTINUED | OUTPATIENT
Start: 2022-09-07 | End: 2022-09-07 | Stop reason: HOSPADM

## 2022-09-07 RX ORDER — NALOXONE HYDROCHLORIDE 0.4 MG/ML
0.4 INJECTION, SOLUTION INTRAMUSCULAR; INTRAVENOUS; SUBCUTANEOUS
Status: DISCONTINUED | OUTPATIENT
Start: 2022-09-07 | End: 2022-09-08 | Stop reason: HOSPADM

## 2022-09-07 RX ORDER — CALCIUM CARBONATE 500 MG/1
500 TABLET, CHEWABLE ORAL 4 TIMES DAILY PRN
Status: DISCONTINUED | OUTPATIENT
Start: 2022-09-07 | End: 2022-09-08 | Stop reason: HOSPADM

## 2022-09-07 RX ORDER — LABETALOL HYDROCHLORIDE 5 MG/ML
10 INJECTION, SOLUTION INTRAVENOUS
Status: DISCONTINUED | OUTPATIENT
Start: 2022-09-07 | End: 2022-09-07

## 2022-09-07 RX ORDER — ONDANSETRON 2 MG/ML
INJECTION INTRAMUSCULAR; INTRAVENOUS PRN
Status: DISCONTINUED | OUTPATIENT
Start: 2022-09-07 | End: 2022-09-07

## 2022-09-07 RX ORDER — DIPHENHYDRAMINE HCL 12.5MG/5ML
12.5 LIQUID (ML) ORAL EVERY 6 HOURS PRN
Status: DISCONTINUED | OUTPATIENT
Start: 2022-09-07 | End: 2022-09-08 | Stop reason: HOSPADM

## 2022-09-07 RX ORDER — SODIUM CHLORIDE, SODIUM LACTATE, POTASSIUM CHLORIDE, CALCIUM CHLORIDE 600; 310; 30; 20 MG/100ML; MG/100ML; MG/100ML; MG/100ML
INJECTION, SOLUTION INTRAVENOUS CONTINUOUS
Status: DISCONTINUED | OUTPATIENT
Start: 2022-09-07 | End: 2022-09-07

## 2022-09-07 RX ORDER — HYDRALAZINE HYDROCHLORIDE 20 MG/ML
2.5-5 INJECTION INTRAMUSCULAR; INTRAVENOUS EVERY 10 MIN PRN
Status: DISCONTINUED | OUTPATIENT
Start: 2022-09-07 | End: 2022-09-07

## 2022-09-07 RX ORDER — ONDANSETRON 2 MG/ML
4 INJECTION INTRAMUSCULAR; INTRAVENOUS EVERY 6 HOURS PRN
Status: DISCONTINUED | OUTPATIENT
Start: 2022-09-07 | End: 2022-09-08 | Stop reason: HOSPADM

## 2022-09-07 RX ORDER — PANTOPRAZOLE SODIUM 40 MG/1
40 TABLET, DELAYED RELEASE ORAL
Status: DISCONTINUED | OUTPATIENT
Start: 2022-09-08 | End: 2022-09-08 | Stop reason: HOSPADM

## 2022-09-07 RX ORDER — BISACODYL 10 MG
10 SUPPOSITORY, RECTAL RECTAL DAILY PRN
Status: DISCONTINUED | OUTPATIENT
Start: 2022-09-07 | End: 2022-09-08 | Stop reason: HOSPADM

## 2022-09-07 RX ORDER — TRANEXAMIC ACID 650 MG/1
1950 TABLET ORAL ONCE
Status: COMPLETED | OUTPATIENT
Start: 2022-09-07 | End: 2022-09-07

## 2022-09-07 RX ORDER — HYDROXYZINE HYDROCHLORIDE 10 MG/1
10 TABLET, FILM COATED ORAL EVERY 6 HOURS PRN
Status: DISCONTINUED | OUTPATIENT
Start: 2022-09-07 | End: 2022-09-08 | Stop reason: HOSPADM

## 2022-09-07 RX ORDER — DEXAMETHASONE SODIUM PHOSPHATE 4 MG/ML
INJECTION, SOLUTION INTRA-ARTICULAR; INTRALESIONAL; INTRAMUSCULAR; INTRAVENOUS; SOFT TISSUE PRN
Status: DISCONTINUED | OUTPATIENT
Start: 2022-09-07 | End: 2022-09-07

## 2022-09-07 RX ORDER — HYDROMORPHONE HCL IN WATER/PF 6 MG/30 ML
0.2 PATIENT CONTROLLED ANALGESIA SYRINGE INTRAVENOUS
Status: DISCONTINUED | OUTPATIENT
Start: 2022-09-07 | End: 2022-09-08 | Stop reason: HOSPADM

## 2022-09-07 RX ORDER — AMOXICILLIN 250 MG
1 CAPSULE ORAL 2 TIMES DAILY
Status: DISCONTINUED | OUTPATIENT
Start: 2022-09-07 | End: 2022-09-08 | Stop reason: HOSPADM

## 2022-09-07 RX ORDER — ATORVASTATIN CALCIUM 20 MG/1
20 TABLET, FILM COATED ORAL AT BEDTIME
Status: DISCONTINUED | OUTPATIENT
Start: 2022-09-07 | End: 2022-09-08 | Stop reason: HOSPADM

## 2022-09-07 RX ORDER — CLOPIDOGREL BISULFATE 75 MG/1
75 TABLET ORAL DAILY
Status: DISCONTINUED | OUTPATIENT
Start: 2022-09-08 | End: 2022-09-07

## 2022-09-07 RX ORDER — HYDROMORPHONE HCL IN WATER/PF 6 MG/30 ML
0.4 PATIENT CONTROLLED ANALGESIA SYRINGE INTRAVENOUS
Status: DISCONTINUED | OUTPATIENT
Start: 2022-09-07 | End: 2022-09-08 | Stop reason: HOSPADM

## 2022-09-07 RX ORDER — OXYCODONE HYDROCHLORIDE 5 MG/1
10 TABLET ORAL EVERY 4 HOURS PRN
Status: DISCONTINUED | OUTPATIENT
Start: 2022-09-07 | End: 2022-09-08 | Stop reason: HOSPADM

## 2022-09-07 RX ORDER — ACETAMINOPHEN 325 MG/1
650 TABLET ORAL EVERY 4 HOURS PRN
Status: DISCONTINUED | OUTPATIENT
Start: 2022-09-10 | End: 2022-09-08 | Stop reason: HOSPADM

## 2022-09-07 RX ORDER — EPHEDRINE SULFATE 50 MG/ML
INJECTION, SOLUTION INTRAMUSCULAR; INTRAVENOUS; SUBCUTANEOUS PRN
Status: DISCONTINUED | OUTPATIENT
Start: 2022-09-07 | End: 2022-09-07

## 2022-09-07 RX ORDER — ACETAMINOPHEN 325 MG/1
975 TABLET ORAL EVERY 6 HOURS PRN
Qty: 100 TABLET | Refills: 0 | Status: SHIPPED | OUTPATIENT
Start: 2022-09-07

## 2022-09-07 RX ORDER — PREGABALIN 150 MG/1
150 CAPSULE ORAL ONCE
Status: COMPLETED | OUTPATIENT
Start: 2022-09-07 | End: 2022-09-07

## 2022-09-07 RX ORDER — OXYCODONE HYDROCHLORIDE 5 MG/1
5 TABLET ORAL EVERY 4 HOURS PRN
Status: DISCONTINUED | OUTPATIENT
Start: 2022-09-07 | End: 2022-09-08 | Stop reason: HOSPADM

## 2022-09-07 RX ORDER — HYDROMORPHONE HYDROCHLORIDE 1 MG/ML
INJECTION, SOLUTION INTRAMUSCULAR; INTRAVENOUS; SUBCUTANEOUS PRN
Status: DISCONTINUED | OUTPATIENT
Start: 2022-09-07 | End: 2022-09-07

## 2022-09-07 RX ORDER — NEOSTIGMINE METHYLSULFATE 1 MG/ML
VIAL (ML) INJECTION PRN
Status: DISCONTINUED | OUTPATIENT
Start: 2022-09-07 | End: 2022-09-07

## 2022-09-07 RX ORDER — LIDOCAINE 40 MG/G
CREAM TOPICAL
Status: DISCONTINUED | OUTPATIENT
Start: 2022-09-07 | End: 2022-09-07 | Stop reason: HOSPADM

## 2022-09-07 RX ORDER — LISINOPRIL 10 MG/1
10 TABLET ORAL EVERY MORNING
Status: DISCONTINUED | OUTPATIENT
Start: 2022-09-08 | End: 2022-09-08 | Stop reason: HOSPADM

## 2022-09-07 RX ORDER — NICOTINE POLACRILEX 4 MG
15-30 LOZENGE BUCCAL
Status: DISCONTINUED | OUTPATIENT
Start: 2022-09-07 | End: 2022-09-08 | Stop reason: HOSPADM

## 2022-09-07 RX ORDER — PROPOFOL 10 MG/ML
INJECTION, EMULSION INTRAVENOUS PRN
Status: DISCONTINUED | OUTPATIENT
Start: 2022-09-07 | End: 2022-09-07

## 2022-09-07 RX ORDER — HYDROMORPHONE HCL IN WATER/PF 6 MG/30 ML
0.4 PATIENT CONTROLLED ANALGESIA SYRINGE INTRAVENOUS EVERY 5 MIN PRN
Status: DISCONTINUED | OUTPATIENT
Start: 2022-09-07 | End: 2022-09-07

## 2022-09-07 RX ORDER — LIDOCAINE 40 MG/G
CREAM TOPICAL
Status: DISCONTINUED | OUTPATIENT
Start: 2022-09-07 | End: 2022-09-08 | Stop reason: HOSPADM

## 2022-09-07 RX ORDER — ALBUTEROL SULFATE 0.83 MG/ML
2.5 SOLUTION RESPIRATORY (INHALATION) EVERY 4 HOURS PRN
Status: DISCONTINUED | OUTPATIENT
Start: 2022-09-07 | End: 2022-09-07

## 2022-09-07 RX ORDER — DEXTROSE MONOHYDRATE 25 G/50ML
25-50 INJECTION, SOLUTION INTRAVENOUS
Status: DISCONTINUED | OUTPATIENT
Start: 2022-09-07 | End: 2022-09-08 | Stop reason: HOSPADM

## 2022-09-07 RX ORDER — KETOROLAC TROMETHAMINE 15 MG/ML
15 INJECTION, SOLUTION INTRAMUSCULAR; INTRAVENOUS EVERY 6 HOURS
Status: COMPLETED | OUTPATIENT
Start: 2022-09-07 | End: 2022-09-08

## 2022-09-07 RX ORDER — MEPERIDINE HYDROCHLORIDE 25 MG/ML
12.5 INJECTION INTRAMUSCULAR; INTRAVENOUS; SUBCUTANEOUS EVERY 5 MIN PRN
Status: DISCONTINUED | OUTPATIENT
Start: 2022-09-07 | End: 2022-09-07

## 2022-09-07 RX ORDER — ASPIRIN 81 MG/1
81 TABLET ORAL 2 TIMES DAILY
Status: DISCONTINUED | OUTPATIENT
Start: 2022-09-08 | End: 2022-09-08 | Stop reason: HOSPADM

## 2022-09-07 RX ORDER — POLYETHYLENE GLYCOL 3350 17 G/17G
17 POWDER, FOR SOLUTION ORAL DAILY
Status: DISCONTINUED | OUTPATIENT
Start: 2022-09-08 | End: 2022-09-08 | Stop reason: HOSPADM

## 2022-09-07 RX ORDER — FENTANYL CITRATE 50 UG/ML
INJECTION, SOLUTION INTRAMUSCULAR; INTRAVENOUS PRN
Status: DISCONTINUED | OUTPATIENT
Start: 2022-09-07 | End: 2022-09-07

## 2022-09-07 RX ORDER — ACETAMINOPHEN 325 MG/1
975 TABLET ORAL ONCE
Status: COMPLETED | OUTPATIENT
Start: 2022-09-07 | End: 2022-09-07

## 2022-09-07 RX ORDER — METHOCARBAMOL 500 MG/1
500 TABLET, FILM COATED ORAL EVERY 6 HOURS PRN
Status: DISCONTINUED | OUTPATIENT
Start: 2022-09-07 | End: 2022-09-08 | Stop reason: HOSPADM

## 2022-09-07 RX ORDER — OXYCODONE HYDROCHLORIDE 5 MG/1
5 TABLET ORAL EVERY 4 HOURS PRN
Status: DISCONTINUED | OUTPATIENT
Start: 2022-09-07 | End: 2022-09-07

## 2022-09-07 RX ORDER — PROCHLORPERAZINE MALEATE 5 MG
5 TABLET ORAL EVERY 6 HOURS PRN
Status: DISCONTINUED | OUTPATIENT
Start: 2022-09-07 | End: 2022-09-08 | Stop reason: HOSPADM

## 2022-09-07 RX ORDER — AMOXICILLIN 250 MG
1-2 CAPSULE ORAL 2 TIMES DAILY
Qty: 30 TABLET | Refills: 0 | Status: SHIPPED | OUTPATIENT
Start: 2022-09-07

## 2022-09-07 RX ORDER — ONDANSETRON 4 MG/1
4 TABLET, ORALLY DISINTEGRATING ORAL EVERY 6 HOURS PRN
Status: DISCONTINUED | OUTPATIENT
Start: 2022-09-07 | End: 2022-09-08 | Stop reason: HOSPADM

## 2022-09-07 RX ORDER — SODIUM CHLORIDE, SODIUM LACTATE, POTASSIUM CHLORIDE, CALCIUM CHLORIDE 600; 310; 30; 20 MG/100ML; MG/100ML; MG/100ML; MG/100ML
INJECTION, SOLUTION INTRAVENOUS CONTINUOUS
Status: DISCONTINUED | OUTPATIENT
Start: 2022-09-07 | End: 2022-09-08 | Stop reason: HOSPADM

## 2022-09-07 RX ORDER — GLYCOPYRROLATE 0.2 MG/ML
INJECTION, SOLUTION INTRAMUSCULAR; INTRAVENOUS PRN
Status: DISCONTINUED | OUTPATIENT
Start: 2022-09-07 | End: 2022-09-07

## 2022-09-07 RX ORDER — CEFAZOLIN SODIUM 2 G/100ML
2 INJECTION, SOLUTION INTRAVENOUS EVERY 8 HOURS
Status: COMPLETED | OUTPATIENT
Start: 2022-09-07 | End: 2022-09-08

## 2022-09-07 RX ORDER — CLOPIDOGREL BISULFATE 75 MG/1
75 TABLET ORAL DAILY
Status: DISCONTINUED | OUTPATIENT
Start: 2022-09-08 | End: 2022-09-08 | Stop reason: HOSPADM

## 2022-09-07 RX ORDER — FENTANYL CITRATE 0.05 MG/ML
50 INJECTION, SOLUTION INTRAMUSCULAR; INTRAVENOUS EVERY 5 MIN PRN
Status: DISCONTINUED | OUTPATIENT
Start: 2022-09-07 | End: 2022-09-07

## 2022-09-07 RX ORDER — CEFAZOLIN SODIUM/WATER 2 G/20 ML
2 SYRINGE (ML) INTRAVENOUS EVERY 8 HOURS
Status: DISCONTINUED | OUTPATIENT
Start: 2022-09-07 | End: 2022-09-07 | Stop reason: ALTCHOICE

## 2022-09-07 RX ADMIN — ACETAMINOPHEN 975 MG: 325 TABLET, FILM COATED ORAL at 20:07

## 2022-09-07 RX ADMIN — OXYCODONE HYDROCHLORIDE 5 MG: 5 TABLET ORAL at 20:07

## 2022-09-07 RX ADMIN — CEFAZOLIN SODIUM 2 G: 2 INJECTION, SOLUTION INTRAVENOUS at 18:59

## 2022-09-07 RX ADMIN — LIDOCAINE HYDROCHLORIDE 100 MG: 20 INJECTION, SOLUTION INFILTRATION; PERINEURAL at 11:25

## 2022-09-07 RX ADMIN — ACETAMINOPHEN 975 MG: 325 TABLET ORAL at 09:20

## 2022-09-07 RX ADMIN — ONDANSETRON 4 MG: 2 INJECTION INTRAMUSCULAR; INTRAVENOUS at 13:32

## 2022-09-07 RX ADMIN — HYDROMORPHONE HYDROCHLORIDE 0.5 MG: 1 INJECTION, SOLUTION INTRAMUSCULAR; INTRAVENOUS; SUBCUTANEOUS at 12:06

## 2022-09-07 RX ADMIN — HYDROMORPHONE HYDROCHLORIDE 0.25 MG: 1 INJECTION, SOLUTION INTRAMUSCULAR; INTRAVENOUS; SUBCUTANEOUS at 12:51

## 2022-09-07 RX ADMIN — Medication 5 MG: at 11:47

## 2022-09-07 RX ADMIN — ROCURONIUM BROMIDE 50 MG: 50 INJECTION, SOLUTION INTRAVENOUS at 11:25

## 2022-09-07 RX ADMIN — SENNOSIDES AND DOCUSATE SODIUM 1 TABLET: 50; 8.6 TABLET ORAL at 20:07

## 2022-09-07 RX ADMIN — NEOSTIGMINE METHYLSULFATE 5 MG: 1 INJECTION, SOLUTION INTRAVENOUS at 13:41

## 2022-09-07 RX ADMIN — SODIUM CHLORIDE, POTASSIUM CHLORIDE, SODIUM LACTATE AND CALCIUM CHLORIDE: 600; 310; 30; 20 INJECTION, SOLUTION INTRAVENOUS at 10:53

## 2022-09-07 RX ADMIN — SODIUM CHLORIDE, POTASSIUM CHLORIDE, SODIUM LACTATE AND CALCIUM CHLORIDE: 600; 310; 30; 20 INJECTION, SOLUTION INTRAVENOUS at 13:34

## 2022-09-07 RX ADMIN — ATORVASTATIN CALCIUM 20 MG: 20 TABLET, FILM COATED ORAL at 22:45

## 2022-09-07 RX ADMIN — TRANEXAMIC ACID 1950 MG: 650 TABLET ORAL at 09:20

## 2022-09-07 RX ADMIN — PREGABALIN 150 MG: 150 CAPSULE ORAL at 09:22

## 2022-09-07 RX ADMIN — HYDROMORPHONE HYDROCHLORIDE 0.25 MG: 1 INJECTION, SOLUTION INTRAMUSCULAR; INTRAVENOUS; SUBCUTANEOUS at 13:09

## 2022-09-07 RX ADMIN — KETOROLAC TROMETHAMINE 15 MG: 15 INJECTION, SOLUTION INTRAMUSCULAR; INTRAVENOUS at 18:57

## 2022-09-07 RX ADMIN — FENTANYL CITRATE 50 MCG: 50 INJECTION, SOLUTION INTRAMUSCULAR; INTRAVENOUS at 14:48

## 2022-09-07 RX ADMIN — PHENYLEPHRINE HYDROCHLORIDE 50 MCG: 10 INJECTION INTRAVENOUS at 13:25

## 2022-09-07 RX ADMIN — KETOROLAC TROMETHAMINE 15 MG: 15 INJECTION, SOLUTION INTRAMUSCULAR; INTRAVENOUS at 22:45

## 2022-09-07 RX ADMIN — FENTANYL CITRATE 100 MCG: 50 INJECTION, SOLUTION INTRAMUSCULAR; INTRAVENOUS at 11:25

## 2022-09-07 RX ADMIN — GLYCOPYRROLATE 1 MG: 0.2 INJECTION, SOLUTION INTRAMUSCULAR; INTRAVENOUS at 13:41

## 2022-09-07 RX ADMIN — Medication 2 G: at 11:26

## 2022-09-07 RX ADMIN — INSULIN ASPART 2 UNITS: 100 INJECTION, SOLUTION INTRAVENOUS; SUBCUTANEOUS at 19:23

## 2022-09-07 RX ADMIN — PROPOFOL 200 MG: 10 INJECTION, EMULSION INTRAVENOUS at 11:25

## 2022-09-07 RX ADMIN — SODIUM CHLORIDE 2 UNITS: 9 INJECTION, SOLUTION INTRAVENOUS at 15:19

## 2022-09-07 RX ADMIN — DEXAMETHASONE SODIUM PHOSPHATE 4 MG: 4 INJECTION, SOLUTION INTRA-ARTICULAR; INTRALESIONAL; INTRAMUSCULAR; INTRAVENOUS; SOFT TISSUE at 11:37

## 2022-09-07 RX ADMIN — Medication 7 MG: at 11:42

## 2022-09-07 RX ADMIN — Medication 5 MG: at 11:52

## 2022-09-07 RX ADMIN — FENTANYL CITRATE 50 MCG: 50 INJECTION, SOLUTION INTRAMUSCULAR; INTRAVENOUS at 14:38

## 2022-09-07 ASSESSMENT — ACTIVITIES OF DAILY LIVING (ADL)
ADLS_ACUITY_SCORE: 22
ADLS_ACUITY_SCORE: 33
ADLS_ACUITY_SCORE: 22
ADLS_ACUITY_SCORE: 22

## 2022-09-07 NOTE — ANESTHESIA PROCEDURE NOTES
Airway       Patient location during procedure: OR       Procedure Start/Stop Times: 9/7/2022 11:27 AM  Staff -        CRNA: Tamika Greco APRN CRNA       Other Anesthesia Staff: Yoselin Turner       Performed By: ARELI  Consent for Airway        Urgency: elective  Indications and Patient Condition       Indications for airway management: conor-procedural       Induction type:intravenous       Mask difficulty assessment: 2 - vent by mask + OA or adjuvant +/- NMBA    Final Airway Details       Final airway type: endotracheal airway       Successful airway: ETT - single and Oral  Endotracheal Airway Details        ETT size (mm): 8.0       Cuffed: yes       Successful intubation technique: video laryngoscopy       VL Blade Size: Frye 4       Grade View of Cords: 1       Adjucts: stylet       Position: Right       Measured from: lips       Secured at (cm): 24       Bite block used: None    Post intubation assessment        Placement verified by: capnometry and chest rise        Number of attempts at approach: 1       Number of other approaches attempted: 0       Secured with: silk tape       Ease of procedure: easy       Dentition: Intact and Unchanged    Medication(s) Administered   Medication Administration Time: 9/7/2022 11:27 AM

## 2022-09-07 NOTE — CONSULTS
[facility]  Consult Note - Hospitalist Service  Date of Admission:  9/7/2022  Consult Requested by: Dr. Raymundo  Reason for Consult: Med rec and Post op medical management T2DM, GERD, HTN, HLD, hx MI, Hx CABG    Assessment & Plan   Fuad Sanchez is a 81 year old male who was admitted for a planned right total hip. He has a past medical history significant for  DMII, HTN, HLPD, MI and CABG. He was admitted for elective right direct anterior total hip arthroplasty with Dr. Ghosh on 9/7/2022. Hospitalist service was consulted post-op for medical management.       S/p right total hip  General anesthesia with EBL of 300 ml. Hemodynamically stable with BP's in the 120's to 130's over 70's to 90's, heart rates in the 50's to 80's and satting 97% on room air. Pain well controlled.   -Post-op management as per Ortho  -Pain, activity, anticoagulation, IVF's, BM regimen, therapies and diet per Ortho  -Aggressive pulmonary hygiene post-op   -wean oxygen to maintain sats >90%   -IS every 1 hour WA   `-Cough and deep breathe  -Hgb in AM  -PRN pain meds  -BM regimen in place  -Therapies    HTN  Hx of CABG  HLPD  Platelet dysfunction   - On Pllavix and Lisinopril PTA., resume in hospital    DMII  - On PTA Amaryl, Lantus 2 units at HS, Metformin 500 mg BID, victoza 1.2 mg subcutaneous daily  - Start Sliding scale insulin.   - Will hold  Scheduled Lantus  - Will add A1c to AM labs      Maryanne Price NP  [facility]  Securely message with the Vocera Web Console (learn more here)  Text page via [facility] Paging/Directory    Hospitalist Service    Clinically Significant Risk Factors Present on Admission                # Platelet Defect: home medication list includes an antiplatelet medication  # Hypertension: home medication list includes antihypertensive(s)    # Overweight: Estimated body mass index is 27.73 kg/m  as calculated from the following:    Height as of this encounter: 1.88 m (6'  "2\").    Weight as of this encounter: 98 kg (216 lb).        ______________________________________________________________________    Chief Complaint   Planned right total hip arthroplasty     History is obtained from the patient    History of Present Illness   Fuad Sanchez is a 81 year old male who was admitted for a planned right total hip. He has a past medical history significant for  DMII, HTN, HLPD, MI and CABG. He was admitted for elective right direct anterior total hip arthroplasty with Dr. Ghosh on 9/7/2022. Hospitalist service was consulted post-op for medical management.     Patient is resting in bed. Wife at bedside. He has no pain. He is getting ready to order his dinner. He has adequate help at home and has no concerns about discharge. Medication list reviewed. Denies further complaints.       Review of Systems   The 10 point Review of Systems is negative other than noted in the HPI or here.     Past Medical History    I have reviewed this patient's medical history and updated it with pertinent information if needed.   Past Medical History:   Diagnosis Date     Arthritis      CHF (congestive heart failure) (H)      Chronic pain of right ankle      Coronary artery disease      Coronary artery disease     stent 7/3/19     Diabetes (H)      Diabetes mellitus (H)      Gastroesophageal reflux disease      Heart attack (H)     2003     Hypercholesterolemia      Hypertension      Hypertension      Pure hypercholesterolemia      Stented coronary artery     CABG 2003, stents       Past Surgical History   I have reviewed this patient's surgical history and updated it with pertinent information if needed.  Past Surgical History:   Procedure Laterality Date     APPENDECTOMY       APPENDECTOMY       ARTHRODESIS ANKLE Right 07/29/2020    Procedure: RIGHT ANKLE ARTHRODESIS;  Surgeon: Giovany Harley DO;  Location: [facility];  Service: Orthopedics     ARTHROPLASTY HIP ANTERIOR Left 03/28/2017    " "Procedure: ARTHROPLASTY HIP ANTERIOR;  Surgeon: Arsh Ghosh MD;  Location:  OR     BACK SURGERY           BACK SURGERY       CARDIAC SURGERY       CARDIAC SURGERY  2003    2 vessel     HERNIA REPAIR       HERNIA REPAIR       TONSILLECTOMY       TONSILLECTOMY       VASECTOMY         Social History   I have reviewed this patient's social history and updated it with pertinent information if needed.  Social History     Tobacco Use     Smoking status: Former Smoker     Packs/day: 2.00     Types: Cigarettes     Quit date: 1971     Years since quittin.3     Smokeless tobacco: Never Used     Tobacco comment: quit    Substance Use Topics     Alcohol use: Yes     Comment: Alcoholic Drinks/day: 2-3 times per week     Drug use: Never         Medications   I have reviewed this patient's current medications    Allergies   Allergies   Allergen Reactions     Aspirin GI Disturbance       Physical Exam   Vital Signs: Temp: 98  F (36.7  C) Temp src: Temporal BP: 130/77 Pulse: 53   Resp: 10 SpO2: 96 % O2 Device: Nasal cannula Oxygen Delivery: 2 LPM  Weight: 216 lbs 0 oz  EXAM:   Nursing Notes Reviewed.  BP (!) 146/71 (BP Location: Right arm, Patient Position: Semi-Berumen's)   Pulse 54   Temp 96.8  F (36  C) (Axillary)   Resp 15   Ht 1.88 m (6' 2\")   Wt 98 kg (216 lb)   SpO2 97%   BMI 27.73 kg/m     General:  Appears stated age, no acute distress. A&O x 3.  Skin:  Warm, dry. No rashes or lesions on exposed skin.  HEENT:  Normocephalic, atraumatic; EOMs grossly intact.  Neck:  Supple.  Chest:  Breath sounds CTA and no increased work of breathing on nasal cannula.   Cardiovascular:  RRR, no rub or murmur. No peripheral edema.  Abdomen:  Soft, non-tender, non-distended.  Musculoskeletal:  Moves all four extremities. No muscle atrophy.  Neurological:  CN 2-12 grossly intact.  Extremities: Right hip with dressing that is clean, dry and intact. No tenderness to palpation. Extremities warm and well " corry BAGLEY's. Pulses 2+ throughout  Psychiatric:  Affect and mood congruent.    Data   Results for orders placed or performed during the hospital encounter of 09/07/22 (from the past 24 hour(s))   Potassium   Result Value Ref Range    Potassium 4.5 3.4 - 5.3 mmol/L   Glucose   Result Value Ref Range    Glucose 140 (H) 70 - 99 mg/dL    Patient Fasting > 8hrs? Yes    Creatinine   Result Value Ref Range    Creatinine 0.96 0.66 - 1.25 mg/dL    GFR Estimate 80 >60 mL/min/1.73m2   XR Surgery ARIELA L/T 5 Min Fluoro w Stills    Narrative    SURGERY C-ARM FLUORO LESS THAN 5 MINUTES WITH STILLS 9/7/2022 1:20 PM     HISTORY: Right anterior total hip.    NUMBER OF IMAGES ACQUIRED: 7    FLUOROSCOPY TIME: .7 minute(s)      Impression    IMPRESSION: C-arm used for a surgical procedure. Images obtained show  placement of a right total hip arthroplasty.    NEREIDA KAMARA MD         SYSTEM ID:  [id number]   Glucose by meter   Result Value Ref Range    GLUCOSE BY METER POCT 222 (H) 70 - 99 mg/dL   XR Pelvis w Hip Port Right 1 View    Narrative    PELVIS AND RIGHT HIP ONE VIEW 9/7/2022 3:33 PM    HISTORY: Status post hip surgery.    COMPARISON: None.      Impression    IMPRESSION: Status post recent right total hip arthroplasty. No  fracture. Prior left total hip arthroplasty.    NEREIDA KAMARA MD         SYSTEM ID:  [id number]   Glucose by meter   Result Value Ref Range    GLUCOSE BY METER POCT 204 (H) 70 - 99 mg/dL

## 2022-09-07 NOTE — ANESTHESIA CARE TRANSFER NOTE
Patient: Fuad Sanchez    Procedure: Procedure(s):  RIGHT TOTAL HIP ARTHROPLASTY,  DIRECT ANTERIOR APPROACH       Diagnosis: Primary osteoarthritis of right hip [M16.11]  Diagnosis Additional Information: No value filed.    Anesthesia Type:   General     Note:    Oropharynx: oropharynx clear of all foreign objects  Level of Consciousness: awake and drowsy  Oxygen Supplementation: face mask  Level of Supplemental Oxygen (L/min / FiO2): 6  Independent Airway: airway patency satisfactory and stable  Dentition: dentition unchanged  Vital Signs Stable: post-procedure vital signs reviewed and stable  Report to RN Given: handoff report given  Patient transferred to: PACU  Comments: To PACU: Arouses easily, good airway, 02 face mask, VSS  Report to RN  Handoff Report: Identifed the Patient, Identified the Reponsible Provider, Reviewed the pertinent medical history, Discussed the surgical course, Reviewed Intra-OP anesthesia mangement and issues during anesthesia, Set expectations for post-procedure period and Allowed opportunity for questions and acknowledgement of understanding      Vitals:  Vitals Value Taken Time   /94 09/07/22 1400   Temp     Pulse 93 09/07/22 1401   Resp 19 09/07/22 1401   SpO2 93 % 09/07/22 1401   Vitals shown include unvalidated device data.    Electronically Signed By: LEONA Valle CRNA  September 7, 2022  2:02 PM

## 2022-09-07 NOTE — ANESTHESIA PREPROCEDURE EVALUATION
Anesthesia Pre-Procedure Evaluation    Patient: Fuad Sanchez   MRN: [id number] : 1940        Procedure : Procedure(s):  RIGHT TOTAL HIP ARTHROPLASTY,  DIRECT ANTERIOR APPROACH          Past Medical History:   Diagnosis Date     Arthritis      CHF (congestive heart failure) (H)      Chronic pain of right ankle      Coronary artery disease      Coronary artery disease     stent 7/3/19     Diabetes (H)      Diabetes mellitus (H)      Gastroesophageal reflux disease      Heart attack (H)          Hypercholesterolemia      Hypertension      Hypertension      Pure hypercholesterolemia      Stented coronary artery     CABG , stents      Past Surgical History:   Procedure Laterality Date     APPENDECTOMY       APPENDECTOMY       ARTHRODESIS ANKLE Right 2020    Procedure: RIGHT ANKLE ARTHRODESIS;  Surgeon: Giovany Harley DO;  Location: [facility];  Service: Orthopedics     ARTHROPLASTY HIP ANTERIOR Left 2017    Procedure: ARTHROPLASTY HIP ANTERIOR;  Surgeon: Arsh Ghosh MD;  Location: [facility]     BACK SURGERY      1968     BACK SURGERY       CARDIAC SURGERY       CARDIAC SURGERY  2003    2 vessel     HERNIA REPAIR       HERNIA REPAIR       TONSILLECTOMY       TONSILLECTOMY       VASECTOMY        Allergies   Allergen Reactions     Aspirin GI Disturbance      Social History     Tobacco Use     Smoking status: Former Smoker     Packs/day: 2.00     Types: Cigarettes     Quit date: 1971     Years since quittin.3     Smokeless tobacco: Never Used     Tobacco comment: quit    Substance Use Topics     Alcohol use: Yes     Comment: Alcoholic Drinks/day: 2-3 times per week      Wt Readings from Last 1 Encounters:   20 100.8 kg (222 lb 5 oz)        Anesthesia Evaluation   Pt has had prior anesthetic.     No history of anesthetic complications       ROS/MED HX  ENT/Pulmonary: Comment: PNA 2021 - since then, can get some chest tightness   (-) sleep apnea    Neurologic:    (-) no CVA   Cardiovascular:     (+) Dyslipidemia hypertension--CAD -[id number]     METS/Exercise Tolerance:     Hematologic:       Musculoskeletal:       GI/Hepatic:     (+) GERD,     Renal/Genitourinary:       Endo:    (-) Type II DM   Psychiatric/Substance Use:       Infectious Disease:       Malignancy:       Other:            Physical Exam    Airway        Mallampati: II   TM distance: > 3 FB   Neck ROM: full   Mouth opening: > 3 cm    Respiratory Devices and Support         Dental  no notable dental history         Cardiovascular   cardiovascular exam normal          Pulmonary   pulmonary exam normal                OUTSIDE LABS:  CBC:   Lab Results   Component Value Date    HGB 11.9 (L) 03/29/2017    HGB 14.0 03/28/2017     03/28/2017     BMP:   Lab Results   Component Value Date     (L) 07/30/2020     03/29/2017    POTASSIUM 4.8 07/30/2020    POTASSIUM 4.7 03/29/2017    CHLORIDE 101 07/30/2020    CHLORIDE 101 03/29/2017    CO2 25 07/30/2020    CO2 27 03/29/2017    BUN 13 07/30/2020    BUN 11 03/29/2017    CR 1.07 07/30/2020    CR 1.03 07/21/2020     (H) 07/30/2020     (H) 07/30/2020     COAGS: No results found for: PTT, INR, FIBR  POC:   Lab Results   Component Value Date     (H) 03/29/2017     HEPATIC: No results found for: ALBUMIN, PROTTOTAL, ALT, AST, GGT, ALKPHOS, BILITOTAL, BILIDIRECT, GINO  OTHER:   Lab Results   Component Value Date    A1C 7.0 (H) 07/30/2020    RON 8.3 (L) 07/30/2020       Anesthesia Plan    ASA Status:  3   NPO Status:  NPO Appropriate    Anesthesia Type: General.     - Airway: ETT   Induction: Propofol, Intravenous.   Maintenance: Balanced.        Consents    Anesthesia Plan(s) and associated risks, benefits, and realistic alternatives discussed. Questions answered and patient/representative(s) expressed understanding.    - Discussed:     - Discussed with:  Patient         Postoperative Care    Pain management: Multi-modal  analgesia.   PONV prophylaxis: Ondansetron (or other 5HT-3), Dexamethasone or Solumedrol     Comments:                Ama Brownlee MD, MD

## 2022-09-07 NOTE — BRIEF OP NOTE
[facility]    Brief Operative Note    Pre-operative diagnosis: Right hip OA  Post-operative diagnosis same  Procedure: RIGHT DIRECT ANTERIOR TOTAL HIP ARTHROPLASTY  Surgeon(s) and Role:     * Arsh Ghosh MD - Primary     * Concepción Pan PA-C - Assisting     * PERRI Sawyer, OPA-C - Assisting  Anesthesia: General   Estimated blood loss: 300 ml  Drains:  None  Specimens: None  Findings:  Advanced OA  Complications: None    Plan: DC home POD1 w/family assist.  DVT prophylaxis w/plavix 75mg and ASA 81mg daily (resume PTA dosing).    Implants:   Implant Name Type Inv. Item Serial No.  Lot No. LRB No. Used Action   IMP CUP ACE PINNACLE 64MM 1217-22064 - [id number] Total Joint Component/Insert IMP CUP ACE PINNACLE 64MM 1217-  [employer]- [id number] Right 1 Implanted   LINER ACETABULAR NEUTRAL 36MM ID 64MM OD PINNACLE ALTRX - [id number] Total Joint Component/Insert LINER ACETABULAR NEUTRAL 36MM ID 64MM OD PINNACLE ALTRX  [employer]- [id number] Right 1 Implanted   IMP SCR BONE CAN ACE 6.5X35MM 1217- - [id number] Metallic Hardware/[place] IMP SCR BONE CAN ACE 6.5X35MM 1217-  [employer]- [id number] Right 1 Implanted   IMP SCR BONE CAN ACE 6.5X30MM 1217- - [id number] Metallic Hardware/[place] IMP SCR BONE CAN ACE 6.5X30MM 1217-  [employer]- [id number] Right 1 Implanted   IMP SCR BONE CAN ACE 6.5X30MM 1217- - [id number] Metallic Hardware/[place] IMP SCR BONE CAN ACE 6.5X30MM 1217-  [employer]- [id number] Right 1 Wasted   IMP STEM FEM DEPUY ACTIS COLLAR HI-OFFSET SZ 8MM 1010- - [id number] Total Joint Component/Insert IMP STEM FEM DEPUY ACTIS COLLAR HI-OFFSET SZ 8MM 1010-  [employer]- [id number] Right 1 Implanted   IMP HEAD FEMORAL DEPUY CERAMIC 36MM +5MM [id number] - [id number] Total Joint Component/Insert IMP HEAD FEMORAL DEPUY CERAMIC 36MM +5MM [id number]  &[facility]-  [id number] Right 1 Implanted   APEX HOLE ELIMINATOR-PS    [employer] [id number] Right 1 Implanted

## 2022-09-07 NOTE — ANESTHESIA POSTPROCEDURE EVALUATION
Patient: Fuad Sanchez    Procedure: Procedure(s):  RIGHT TOTAL HIP ARTHROPLASTY,  DIRECT ANTERIOR APPROACH       Anesthesia Type:  General    Note:     Postop Pain Control: Uneventful            Sign Out: Well controlled pain   PONV: No   Neuro/Psych: Uneventful            Sign Out: Acceptable/Baseline neuro status   Airway/Respiratory: Uneventful            Sign Out: Acceptable/Baseline resp. status   CV/Hemodynamics: Uneventful            Sign Out: Acceptable CV status; No obvious hypovolemia; No obvious fluid overload   Other NRE: NONE   DID A NON-ROUTINE EVENT OCCUR? No           Last vitals:  Vitals Value Taken Time   /79 09/07/22 1515   Temp 36.1  C (97  F) 09/07/22 1357   Pulse 59 09/07/22 1526   Resp 33 09/07/22 1526   SpO2 96 % 09/07/22 1526   Vitals shown include unvalidated device data.    Electronically Signed By: Ama Brownlee MD, MD  September 7, 2022  3:27 PM  
show

## 2022-09-08 ENCOUNTER — APPOINTMENT (OUTPATIENT)
Dept: PHYSICAL THERAPY | Facility: CLINIC | Age: 82
End: 2022-09-08
Attending: ORTHOPAEDIC SURGERY
Payer: COMMERCIAL

## 2022-09-08 ENCOUNTER — APPOINTMENT (OUTPATIENT)
Dept: OCCUPATIONAL THERAPY | Facility: CLINIC | Age: 82
End: 2022-09-08
Attending: ORTHOPAEDIC SURGERY
Payer: COMMERCIAL

## 2022-09-08 VITALS
OXYGEN SATURATION: 97 % | RESPIRATION RATE: 16 BRPM | WEIGHT: 216 LBS | TEMPERATURE: 98 F | SYSTOLIC BLOOD PRESSURE: 110 MMHG | DIASTOLIC BLOOD PRESSURE: 48 MMHG | HEIGHT: 74 IN | BODY MASS INDEX: 27.72 KG/M2 | HEART RATE: 71 BPM

## 2022-09-08 LAB
FASTING STATUS PATIENT QL REPORTED: YES
GLUCOSE BLD-MCNC: 208 MG/DL (ref 70–99)
GLUCOSE BLDC GLUCOMTR-MCNC: 226 MG/DL (ref 70–99)
HGB BLD-MCNC: 11.3 G/DL (ref 13.3–17.7)

## 2022-09-08 PROCEDURE — 99232 SBSQ HOSP IP/OBS MODERATE 35: CPT | Performed by: PHYSICIAN ASSISTANT

## 2022-09-08 PROCEDURE — 250N000013 HC RX MED GY IP 250 OP 250 PS 637: Performed by: NURSE PRACTITIONER

## 2022-09-08 PROCEDURE — 250N000011 HC RX IP 250 OP 636: Performed by: ORTHOPAEDIC SURGERY

## 2022-09-08 PROCEDURE — 85018 HEMOGLOBIN: CPT | Performed by: ORTHOPAEDIC SURGERY

## 2022-09-08 PROCEDURE — 250N000013 HC RX MED GY IP 250 OP 250 PS 637: Performed by: ORTHOPAEDIC SURGERY

## 2022-09-08 PROCEDURE — 97535 SELF CARE MNGMENT TRAINING: CPT | Mod: GO | Performed by: OCCUPATIONAL THERAPIST

## 2022-09-08 PROCEDURE — 82962 GLUCOSE BLOOD TEST: CPT

## 2022-09-08 PROCEDURE — 36415 COLL VENOUS BLD VENIPUNCTURE: CPT | Performed by: ORTHOPAEDIC SURGERY

## 2022-09-08 PROCEDURE — 97165 OT EVAL LOW COMPLEX 30 MIN: CPT | Mod: GO | Performed by: OCCUPATIONAL THERAPIST

## 2022-09-08 PROCEDURE — 82947 ASSAY GLUCOSE BLOOD QUANT: CPT | Performed by: ORTHOPAEDIC SURGERY

## 2022-09-08 RX ORDER — OXYCODONE HYDROCHLORIDE 5 MG/1
5-10 TABLET ORAL EVERY 4 HOURS PRN
Qty: 26 TABLET | Refills: 0 | Status: SHIPPED | OUTPATIENT
Start: 2022-09-08

## 2022-09-08 RX ADMIN — ASPIRIN 81 MG: 81 TABLET, COATED ORAL at 08:07

## 2022-09-08 RX ADMIN — KETOROLAC TROMETHAMINE 15 MG: 15 INJECTION, SOLUTION INTRAMUSCULAR; INTRAVENOUS at 11:48

## 2022-09-08 RX ADMIN — SENNOSIDES AND DOCUSATE SODIUM 1 TABLET: 50; 8.6 TABLET ORAL at 08:08

## 2022-09-08 RX ADMIN — CLOPIDOGREL BISULFATE 75 MG: 75 TABLET ORAL at 08:07

## 2022-09-08 RX ADMIN — LISINOPRIL 10 MG: 10 TABLET ORAL at 08:07

## 2022-09-08 RX ADMIN — CEFAZOLIN SODIUM 2 G: 2 INJECTION, SOLUTION INTRAVENOUS at 04:19

## 2022-09-08 RX ADMIN — PANTOPRAZOLE SODIUM 40 MG: 40 TABLET, DELAYED RELEASE ORAL at 06:35

## 2022-09-08 RX ADMIN — INSULIN ASPART 2 UNITS: 100 INJECTION, SOLUTION INTRAVENOUS; SUBCUTANEOUS at 08:02

## 2022-09-08 RX ADMIN — KETOROLAC TROMETHAMINE 15 MG: 15 INJECTION, SOLUTION INTRAMUSCULAR; INTRAVENOUS at 04:18

## 2022-09-08 RX ADMIN — ACETAMINOPHEN 975 MG: 325 TABLET, FILM COATED ORAL at 04:18

## 2022-09-08 RX ADMIN — POLYETHYLENE GLYCOL 3350 17 G: 17 POWDER, FOR SOLUTION ORAL at 08:09

## 2022-09-08 ASSESSMENT — ACTIVITIES OF DAILY LIVING (ADL)
ADLS_ACUITY_SCORE: 22
PREVIOUS_RESPONSIBILITIES: MEAL PREP;HOUSEKEEPING;LAUNDRY;SHOPPING;YARDWORK;MEDICATION MANAGEMENT;FINANCES;DRIVING
ADLS_ACUITY_SCORE: 22
ADLS_ACUITY_SCORE: 22

## 2022-09-08 NOTE — PROGRESS NOTES
09/08/22 0800   Quick Adds   Type of Visit Initial Occupational Therapy Evaluation   Living Environment   People in Home spouse   Current Living Arrangements house   Home Accessibility no concerns   Transportation Anticipated family or friend will provide   Living Environment Comments Pt discharging to a level entry home without stairs, spouse able to assist as needed. Pt has a walk in shower with a shower chair if needed. Pt has standard toilets with a riser and grab bars   Self-Care   Usual Activity Tolerance good   Current Activity Tolerance good   Equipment Currently Used at Home cane, straight;walker, rolling   Fall history within last six months yes   Number of times patient has fallen within last six months 3   Activity/Exercise/Self-Care Comment Pt independent with ADLs and mobility at baseline. Pt was using a cane prior to surgery due to hip pain   Instrumental Activities of Daily Living (IADL)   Previous Responsibilities meal prep;housekeeping;laundry;shopping;yardwork;medication management;finances;driving   IADL Comments Patient spouse able to assist as needed   General Information   Onset of Illness/Injury or Date of Surgery 09/07/22   Referring Physician Arsh Ghosh MD   Patient/Family Therapy Goal Statement (OT) Patient would like to return home   Additional Occupational Profile Info/Pertinent History of Current Problem Past medical history significant for OA, CAD (history of MI s/p CABG), HTN, HLP, DM2, GERD who underwent an elective right GLENROY. He was admitted for elective right direct anterior total hip arthroplasty with Dr. Ghosh on 9/7/2022   Existing Precautions/Restrictions fall   Right Lower Extremity (Weight-bearing Status) weight-bearing as tolerated (WBAT)   Cognitive Status Examination   Orientation Status orientation to person, place and time   Affect/Mental Status (Cognitive) WFL   Follows Commands WFL   Cognitive Status Comments Patient appears cogntively intact, able to recall  education   Sensory   Sensory Comments Intact   Pain Assessment   Patient Currently in Pain Yes, see Vital Sign flowsheet   Integumentary/Edema   Integumentary/Edema no deficits were identifed   Posture   Posture not impaired   Range of Motion Comprehensive   Comment, General Range of Motion Mild RLE hip flexion impairment due to pain, LLE and BUE   Strength Comprehensive (MMT)   Comment, General Manual Muscle Testing (MMT) Assessment Decreased activity tolerance due to pain   Bed Mobility   Comment (Bed Mobility) SBA   Transfers   Transfer Comments CGA   Balance   Balance Comments Intact with walker   Activities of Daily Living   BADL Assessment/Intervention bathing;lower body dressing;toileting   Bathing Assessment/Intervention   Comment, (Bathing) Min assist to prevent falls   Lower Body Dressing Assessment/Training   Comment, (Lower Body Dressing) Min assist   Toileting   Comment, (Toileting) CGA   Clinical Impression   Criteria for Skilled Therapeutic Interventions Met (OT) Yes, treatment indicated   OT Diagnosis Decline in ADL independence and safety   Influenced by the following impairments RTHA   OT Problem List-Impairments impacting ADL problems related to;activity tolerance impaired;pain   Assessment of Occupational Performance 1-3 Performance Deficits   Identified Performance Deficits Impaired bathing and dressing independence   Planned Therapy Interventions (OT) ADL retraining   Clinical Decision Making Complexity (OT) low complexity   Anticipated Equipment Needs Upon Discharge (OT)   (Pt owns all necessary AE)   Risk & Benefits of therapy have been explained evaluation/treatment results reviewed;care plan/treatment goals reviewed;risks/benefits reviewed;current/potential barriers reviewed;participants voiced agreement with care plan;participants included;patient;caregiver   OT Discharge Planning   OT Discharge Recommendation (DC Rec)   (Defer to ortho MD)   OT Rationale for DC Rec Patient appears safe  and able to DC home with intermittent assist from family for higher level IADLS. Defer to ortho MD for further therapy recommendations   OT Brief overview of current status CGA mobility   Therapy Certification   Start of Care Date 09/08/22   Certification date from 09/08/22   Certification date to 09/08/22   Medical Diagnosis RTHA   Total Evaluation Time (Minutes)   Total Evaluation Time (Minutes) 8   OT Goals   Therapy Frequency (OT) One time eval and treatment   OT Predicted Duration/Target Date for Goal Attainment 09/08/22   OT Goals Lower Body Dressing;OT Goal 1   OT: Lower Body Dressing Modified independent;Goal Met   OT: Goal 1 Patient will verbalize understanding of AE and safety recommendations to improve showering safety and independence.  (Goal Met)

## 2022-09-08 NOTE — PLAN OF CARE
Occupational Therapy Discharge Summary    Reason for therapy discharge:    All goals and outcomes met, no further needs identified.    Progress towards therapy goal(s). See goals on Care Plan in [facility] electronic health record for goal details.  Goals met    Therapy recommendation(s):    Defer to ortho MD for further therapy recommendations. Patient appears safe and able to discharge home with intermittent assist with higher level IADLS.

## 2022-09-08 NOTE — PLAN OF CARE
Physical Therapy Discharge Summary    Reason for therapy discharge:    All goals and outcomes met, no further needs identified.    Progress towards therapy goal(s). See goals on Care Plan in [facility] electronic health record for goal details.  Goals met    Therapy recommendation(s):    Defer to tucker Farias PT

## 2022-09-08 NOTE — PLAN OF CARE
Goal Outcome Evaluation:  .Patient vital signs are at baseline: Yes  Patient able to ambulate as they were prior to admission or with assist devices provided by therapies during their stay:  Yes  Patient MUST void prior to discharge:  No,  Reason:  dtv, stright cat before came on the floor  Patient able to tolerate oral intake:  Yes  Pain has adequate pain control using Oral analgesics:  No,  Reason:  No pain  Does patient have an identified :  Yes  Has goal D/C date and time been discussed with patient:  Yes

## 2022-09-08 NOTE — PLAN OF CARE
[facility]      OUTPATIENT PHYSICAL THERAPY EVALUATION  PLAN OF TREATMENT FOR OUTPATIENT REHABILITATION  (COMPLETE FOR INITIAL CLAIMS ONLY)  Patient's Last Name, First Name, M.I.  YOB: 1940  Fuad Sanchez                        Provider's Name  [facility] Medical Record No.  [id number]                               Onset Date:  09/07/22   Start of Care Date:  09/07/22      Type:     _X_PT   ___OT   ___SLP Medical Diagnosis:  Right GLENROY                        PT Diagnosis:  impaired gait   Visits from SOC:  1   _________________________________________________________________________________  Plan of Treatment/Functional Goals    Planned Interventions: balance training, bed mobility training, cryotherapy, gait training, home exercise program, patient/family education, ROM (range of motion), stair training, strengthening, stretching, transfer training, progressive activity/exercise     Goals: See Physical Therapy Goals on Care Plan in [facility] electronic health record.    Therapy Frequency: 2x/day  Predicted Duration of Therapy Intervention: 09/08/22  _________________________________________________________________________________    I CERTIFY THE NEED FOR THESE SERVICES FURNISHED UNDER        THIS PLAN OF TREATMENT AND WHILE UNDER MY CARE     (Physician co-signature of this document indicates review and certification of the therapy plan).                Certification date from: 09/07/22, Certification date to: 09/14/22    Referring Physician: Arsh Ghosh MD            Initial Assessment        See Physical Therapy evaluation dated 09/07/22 in Epic electronic health record.

## 2022-09-08 NOTE — PROGRESS NOTES
"ORTHOPEDIC LOWER EXTREMITY PROGRESS NOTE    POD#1  Patient is a 81 year old male who underwent Procedure(s):  RIGHT TOTAL HIP ARTHROPLASTY,  DIRECT ANTERIOR APPROACH on 2022. Pain is well controlled. Tolerating medication well, no nausea or vomiting.  Wife is here with him.  Therapy went well.  Discharge instructions reviewed.    Vitals:   Blood pressure 110/48, pulse 71, temperature 98  F (36.7  C), temperature source Oral, resp. rate 16, height 1.88 m (6' 2\"), weight 98 kg (216 lb), SpO2 97 %.  Temp (24hrs), Av.5  F (36.4  C), Min:96.8  F (36  C), Max:98  F (36.7  C)      Drains: None    EXAM   The patient is awake and alert.  Calves are soft and non-tender.   Sensation is intact.  Dorsiflexion and plantar flexion is intact.  Foot warm with nl cap refill.  The incision is covered.     Labs:   Recent Labs   Lab Test 22  0714 17  0715 17  0605   HGB 11.3* 11.9* 14.0       ASSESSMENT  S/p R GLENROY   PLAN  1. DVT prophylaxis: aspirin and Plavix  2. Weight Bearing: WBAT (Weight bearing as tolerated).  3. Anticipated discharge date today . Discharge to Home with No Skilled Service.  4. Cont Pain Control Oxycodone and Tylenol    Sanjuanita Jeter PA-C  [facility]        "

## 2022-09-08 NOTE — PROGRESS NOTES
Discharge education provided to patient. Patient verbalized understanding of discharge instructions and upcoming appointments. Patient left with all personal belongings. Patient discharging to home and transportation provided by wife.     [General Appearance - Alert] : alert [General Appearance - In No Acute Distress] : in no acute distress [General Appearance - Well-Appearing] : well appearing [Attitude Uncooperative] : cooperative [Appearance Of Head] : the head was normocephalic [Facies] : there were no dysmorphic facial features [Sclera] : the conjunctiva were normal [Wheezing Unilaterally On The Left At The Midlung Field] : wheezing was heard over the left midlung field [Scattered Wheezes] : scattered wheezing was heard [Normal Chest Appearance] : the chest was normal in appearance [Chest Visual Inspection Thoracic Deformity] : no chest wall deformity [Chest Surgical / Traumatic Scar] : chest incision well healed [No Sternal Instability] : no sternal instability [Chest Palpation Tender Sternum] : no chest wall tenderness [Apical Impulse] : quiet precordium with normal apical impulse [Heart Rate And Rhythm] : normal heart rate and rhythm [Heart Sounds] : normal S1 and S2 [Heart Sounds Gallop] : no gallops [Heart Sounds Pericardial Friction Rub] : no pericardial rub [Heart Sounds Click] : no clicks [Arterial Pulses] : normal upper and lower extremity pulses with no pulse delay [Capillary Refill Test] : normal capillary refill [Edema] : no edema [LUSB] : LUSB [No Diastolic Murmur] : no diastolic murmur was heard [LMSB] : LMSB  [II] : a grade 2/6 [Systolic] : systolic [FreeTextEntry1] : mechanical first heart sound in apical position, mechanical second heart sound loudest left of sternum with a soft systolic 1/6 crescendo-decrescendo murmur. [Bowel Sounds] : normal bowel sounds [Holosystolic] : holosystolic [Abdomen Soft] : soft [Nondistended] : nondistended [Abdomen Tenderness] : non-tender [Musculoskeletal - Tenderness] : no joint tenderness was elicited [Nail Clubbing] : no clubbing  or cyanosis of the fingers [Musculoskeletal Exam: Normal Movement Of All Extremities] : normal movements of all extremities [Musculoskeletal - Swelling] : no joint swelling seen [] : no rash [Skin Lesions] : no lesions [Skin Turgor] : normal turgor [Demonstrated Behavior - Infant Nonreactive To Parents] : interactive [Mood] : mood and affect were appropriate for age [Demonstrated Behavior] : normal behavior

## 2022-09-08 NOTE — PROGRESS NOTES
09/07/22 1800   Quick Adds   Type of Visit Initial PT Evaluation   Living Environment   People in Home spouse   Current Living Arrangements house   Home Accessibility no concerns   Transportation Anticipated family or friend will provide   Living Environment Comments Pt lives with spouse who can provide good assist 24/7, no stairs   Self-Care   Usual Activity Tolerance good   Current Activity Tolerance moderate   Equipment Currently Used at Home cane, straight;walker, rolling   Fall history within last six months yes   Number of times patient has fallen within last six months 3   Activity/Exercise/Self-Care Comment IND at baseline with mobility, was using SEC at times recently, IND with ADL's   General Information   Onset of Illness/Injury or Date of Surgery 09/07/22   Referring Physician Arsh Ghosh MD   Pertinent History of Current Problem (include personal factors and/or comorbidities that impact the POC) Per chart review: Fuad Sanchez is a 81 year old male who was admitted for a planned right total hip. He has a past medical history significant for  DMII, HTN, HLPD, MI and CABG. He was admitted for elective right direct anterior total hip arthroplasty with Dr. Ghosh on 9/7/2022, POD#0   Existing Precautions/Restrictions fall  (no precautions secondary to anterior approach)   Weight-Bearing Status - RLE weight-bearing as tolerated   Cognition   Affect/Mental Status (Cognition) WNL   Orientation Status (Cognition) oriented x 4   Follows Commands (Cognition) WNL   Integumentary/Edema   Integumentary/Edema Comments dressing over right anterior hip appears CDI   Posture    Posture Not impaired   Range of Motion (ROM)   Range of Motion ROM deficits secondary to surgical procedure;ROM deficits secondary to pain   ROM Comment deficits with right hip after GLENROY, otherwise appears grossly WFL   Strength (Manual Muscle Testing)   Strength (Manual Muscle Testing) Able to perform R SLR;Able to perform L  SLR;Deficits observed during functional mobility   Strength Comments not formally assessed, deficits with Left hip after GLENROY, appears grossly antigravity   Bed Mobility   Comment, (Bed Mobility) supine<>sit SBA   Transfers   Comment, (Transfers) sit<>stand with FWW CGA   Gait/Stairs (Locomotion)   [place] Level (Gait) contact guard   Assistive Device (Gait) walker, front-wheeled   Distance in Feet (Required for LE Total Joints) 10' eval + 40'   Pattern (Gait) step-through   Deviations/Abnormal Patterns (Gait) antalgic;dudley decreased;gait speed decreased;stride length decreased;weight shifting decreased   Comment, (Gait/Stairs) ambulate with FWW CGA   Balance   Balance Comments sitting EOB unsupported, steady standing with FWW, deficits with dynamic balance   Sensory Examination   Sensory Perception patient reports no sensory changes   Clinical Impression   Criteria for Skilled Therapeutic Intervention Yes, treatment indicated   PT Diagnosis (PT) impaired gait   Influenced by the following impairments pain, deficits with functional ROM, strength, and balance   Functional limitations due to impairments reduced activity tolerance, reduced independence with transfers, ambulation, ADL's   Clinical Presentation (PT Evaluation Complexity) Stable/Uncomplicated   Clinical Presentation Rationale PMH and clinical judgement   Clinical Decision Making (Complexity) low complexity   Planned Therapy Interventions (PT) balance training;bed mobility training;cryotherapy;gait training;home exercise program;patient/family education;ROM (range of motion);stair training;strengthening;stretching;transfer training;progressive activity/exercise   Anticipated Equipment Needs at Discharge (PT) walker, rolling   Risk & Benefits of therapy have been explained evaluation/treatment results reviewed;care plan/treatment goals reviewed;risks/benefits reviewed;current/potential barriers reviewed;participants voiced agreement with care  plan;participants included;patient   PT Discharge Planning   PT Discharge Recommendation (DC Rec)   (defer to ortho)   PT Rationale for DC Rec Pt below baseline but moving well using safe technique. Anticipate at discahrge pt will be Mod-I with bed mobility, SBA with transfers using FWW, SBA ambulating with FWW. Will have good 24/7 assist with spouse   Plan of Care Review   Plan of Care Reviewed With patient;spouse   Therapy Certification   Start of care date 09/07/22   Certification date from 09/07/22   Certification date to 09/14/22   Medical Diagnosis Right GLENROY   Total Evaluation Time   Total Evaluation Time (Minutes) 10   Physical Therapy Goals   PT Frequency 2x/day   PT Predicted Duration/Target Date for Goal Attainment 09/08/22   PT Goals Bed Mobility;Transfers;Gait;Stairs   PT: Bed Mobility Modified independent;Supine to/from sit;Rolling;Bridging   PT: Transfers Supervision/stand-by assist;Sit to/from stand;Assistive device   PT: Gait Supervision/stand-by assist;Rolling walker;50 feet

## 2022-09-08 NOTE — PLAN OF CARE
[facility]      OUTPATIENT OCCUPATIONAL THERAPY  EVALUATION  PLAN OF TREATMENT FOR OUTPATIENT REHABILITATION  (COMPLETE FOR INITIAL CLAIMS ONLY)  Patient's Last Name, First Name, M.I.  YOB: 1940  DanielFuad  EMELY                          Provider's Name  [facility] Medical Record No.  [id number]                               Onset Date:  09/07/22   Start of Care Date:  09/08/22     Type:     ___PT   _X_OT   ___SLP Medical Diagnosis:  RTHA                        OT Diagnosis:  Decline in ADL independence and safety   Visits from SOC:  1   _________________________________________________________________________________  Plan of Treatment/Functional Goals    Planned Interventions: ADL retraining   Goals: See Occupational Therapy Goals on Care Plan in Pounce electronic health record.    Therapy Frequency: One time eval and treatment  Predicted Duration of Therapy Intervention: 09/08/22  _________________________________________________________________________________    I CERTIFY THE NEED FOR THESE SERVICES FURNISHED UNDER        THIS PLAN OF TREATMENT AND WHILE UNDER MY CARE     (Physician co-signature of this document indicates review and certification of the therapy plan).              Certification date from: 09/08/22, Certification date to: 09/08/22    Referring Physician: Arsh Ghosh MD            Initial Assessment        See Occupational Therapy evaluation dated 09/08/22 in Epic electronic health record.

## 2022-09-08 NOTE — PROGRESS NOTES
[facility]    Hospitalist Progress Note    Assessment & Plan   Fuad Sanchez is a 81 year old male who was admitted on 9/7/2022.     Past medical history significant for OA, CAD (history of MI s/p CABG), HTN, HLP, DM2, GERD who underwent an elective right GLENROY. He was admitted for elective right direct anterior total hip arthroplasty with Dr. Ghosh on 9/7/2022. Hospitalist service was consulted post-op for medical management.     OA  S/p right total hip  General anesthesia with EBL of 300 ml. Hemodynamically stable with BP's in the 120's to 130's over 70's to 90's, heart rates in the 50's to 80's and satting 97% on room air. Pain well controlled.   -Post-op management as per Ortho.  -Analgesics, activity, anticoagulation, IVF's, BM regimen, therapies and diet per Ortho.  -Aggressive pulmonary hygiene post-op:   --Wean oxygen to maintain sats >90%.   --IS every 1 hour WA.   --Cough and deep breathe.  -BM regimen in place.  -Therapies.    Possible urinary retention  Has yet to urinate and bladder scanned at 04:30 for 364.  Patient will attempt to urinate after working with therapies.  Patient and family aware that a Sanchez catheter might be placed if he is unable to urinate.  Discussed   -Management per Ortho.      Acute anemia  Pre-op HGB at 13.6.  POD #1 HGB at 11.3.  Suspect this is due to blood loss from surgery as well as dilutional effect with IV Fluids.    -Monitor.      CAD with a history of MI s/p CABG  HTN  HLP  Platelet dysfunction   *PTA management with Lipitor 20 mg at bedtime, lisinopril 10 mg every morning, Plavix 75 mg/d, and PRN SL Nitroglycerin.    -Resumed on PTA lisinopril and Plavix.    -Resumed on PTA Lipitor.    DMII  Hyperglycemia   *Managed PTA with Amaryl 2 mg every morning, Lantus 2 units at HS, Metformin 500 mg BID, victoza 1.2 mg subcutaneous every morning.    *Patient received 4 mg of intra-op IV dexamethasone.    *Hyperglycemia likely due to steroid and stress  "from surgery.  A1c of 6.3.    -Hold PTA regimen with plan to resume at discharge  -Medium intensity sliding scale insulin.     GERD  -Resumed on PPI (hospital formulary switched PTA omeprazole to Protonix).      Clinically Significant Risk Factors Present on Admission                # Platelet Defect: home medication list includes an antiplatelet medication  # Hypertension: home medication list includes antihypertensive(s)    # Overweight: Estimated body mass index is 27.73 kg/m  as calculated from the following:    Height as of this encounter: 1.88 m (6' 2\").    Weight as of this encounter: 98 kg (216 lb).          Diet: Advance Diet as Tolerated: Regular Diet Adult  Regular Diet Adult     DVT Prophylaxis: Defer to primary service   Sanchez Catheter: Not present  Central Lines: None  Cardiac Monitoring: None  Code Status: Full Code      Disposition Plan    Per Ortho.  Home PTA medications reconciled for discharge.        The patient's care was discussed with the Patient and Patient's Family.      The patient has been discussed with Dr. Reyna, who agrees with the assessment and plan at this time.    Florentin Menjivar PA-C  [facility]  Securely message with the Vocera Web Console (learn more here)  Text page via [facility] Paging/Directory      Interval History   Patient was seated at the edge of the bed upon arrival with family present in the room.  He denied fever, chills, chest pain, shortness of breath or abdominal pain.  He has yet to urinate and was bladder scanned earlier this morning.  He has not been passing flatus or had a bowel movement.     Reviewed blood pressure medication and diabetes management here and at discharge.  We reviewed potential for Sanchez Catheter placement if he is unable to urinate.      -Data reviewed today: I reviewed all new labs and imaging results over the last 24 hours. I personally reviewed no images or EKG's today.    Physical Exam   BP (!) 151/86 (BP " "Location: Right arm)   Pulse 91   Temp 98  F (36.7  C) (Oral)   Resp 17   Ht 1.88 m (6' 2\")   Wt 98 kg (216 lb)   SpO2 99%   BMI 27.73 kg/m      Constitutional: Awake, alert, cooperative, no apparent distress.    ENT: Normocephalic, without obvious abnormality, atraumatic, oral pharynx with moist mucus membranes, tonsils without erythema or exudates.  Neck: Supple, symmetrical, trachea midline, no adenopathy.  Pulmonary: No increased work of breathing, good air exchange, clear to auscultation bilaterally, no crackles or wheezing.  Cardiovascular: Regular rate and rhythm, normal S1 and S2, no S3 or S4, and no murmur noted.  GI: Normal bowel sounds, soft, non-distended, non-tender.  Skin/Integumen: Visualized skin appeared clear.  Neuro: CN II-XII grossly intact.  Psych:  Alert and oriented x 3. Normal affect.  Extremities: No lower extremity edema noted, and calves are non-TTP bilaterally.       Medications     lactated ringers 100 mL/hr at 09/07/22 1653       acetaminophen  975 mg Oral Q8H     aspirin  81 mg Oral BID     atorvastatin  20 mg Oral At Bedtime     clopidogrel  75 mg Oral Daily     insulin aspart  1-7 Units Subcutaneous TID AC     insulin aspart  1-5 Units Subcutaneous At Bedtime     ketorolac  15 mg Intravenous Q6H     lisinopril  10 mg Oral QAM     pantoprazole  40 mg Oral QAM AC     polyethylene glycol  17 g Oral Daily     senna-docusate  1 tablet Oral BID     sodium chloride (PF)  3 mL Intracatheter Q8H       Data   Recent Labs   Lab 09/08/22  0714 09/08/22  0213 09/07/22  2139 09/07/22  1513 09/07/22  0912   HGB 11.3*  --   --   --   --    POTASSIUM  --   --   --   --  4.5   CR  --   --   --   --  0.96   * 226* 264*   < > 140*    < > = values in this interval not displayed.       Recent Results (from the past 24 hour(s))   XR Surgery ARIELA L/T 5 Min Fluoro w Stills    Narrative    SURGERY C-ARM FLUORO LESS THAN 5 MINUTES WITH STILLS 9/7/2022 1:20 PM     HISTORY: Right anterior total " hip.    NUMBER OF IMAGES ACQUIRED: 7    FLUOROSCOPY TIME: .7 minute(s)      Impression    IMPRESSION: C-arm used for a surgical procedure. Images obtained show  placement of a right total hip arthroplasty.    NEREIDA KAMARA MD         SYSTEM ID:  [id number]   XR Pelvis w Hip Port Right 1 View    Narrative    PELVIS AND RIGHT HIP ONE VIEW 9/7/2022 3:33 PM    HISTORY: Status post hip surgery.    COMPARISON: None.      Impression    IMPRESSION: Status post recent right total hip arthroplasty. No  fracture. Prior left total hip arthroplasty.    NEREIDA KAMARA MD         SYSTEM ID:  [id number]

## 2022-09-12 NOTE — OP NOTE
DATE OF SERVICE:  9/7/2022    SURGEON  JOSE QURESHI M.D.    ASSISTANT  Concepción Pan, MARIUM - Assisting  PERRI Sawyer, SEBASTIAN-C - Assisting    PREOPERATIVE DIAGNOSIS   Right hip osteoarthritis, failed to respond to conservative management.    POSTOPERATIVE DIAGNOSIS   Right hip osteoarthritis, failed to respond to conservative management.    TITLE OF PROCEDURE   Right total hip arthroplasty, Depuy uncemented components, direct anterior approach.    PROCEDURE  The patient was brought to the operating room and after satisfactory anesthesia was placed on the [place] table. The right  lower extremity was then prepped and draped in the usual sterile fashion. Image intensification was utilized during the case for component positioning as well as leg length assessment. An incision was made just lateral to the ASIS and coursing toward the proximal femur. Dissection was carried down to the TFL. The fascia overlying the TFL was incised and dissection was carried down to the interval between TFL and rectus femoris. This was identified. A lateral cobra retractor was placed followed by a medial cobra around the femoral neck. The leash vessels, anterior circumflex, was identified and was coagulated. The underlying fascia was released. Dissection was carried down to the hip capsule. Capsule was identified and a capsulotomy was performed in a T-type fashion first along the intertrochanteric line and then secondarily up along the femoral neck and head, finally completed by releasing along the saddle of the trochanter. The capsular edges were tagged for later repair. The hip was then externally rotated and medial capsular release was performed such that the lesser trochanter could be palpated. Following this, the femoral neck was osteotomized as per the preoperative plan. The femoral head was removed with a corkscrew without difficulty. The acetabulum was exposed and was reamed sequentially up to 64 mm. This was reamed under both  direct visualization as well as the aid of image intensification. A 64 mm Kingston cup was impacted into place in approximately 40 to 45 degrees of abduction, and 15 degrees of anteversion. Three screws were placed and this gave excellent fixation. The 36 mm neutral liner was impacted into place.     Attention was then directed to the femur. The hook was placed underneath the proximal aspect of the femur between the trochanteric ridge and gluteus yousuf. The hip was then brought into external rotation, adduction, and extension. The femur was then carefully elevated using the appropriate releases off the inside of the greater trochanter. Once the femur was elevated, a starter broach was placed followed by sequential broaches. The broaches were performed up to size 8 Actis, which gave excellent torsinal as well as axial stability. Trial reduction was performed with a high offset +5mm head. The hip was reduced and with hip reduction the combined anteversion looked excellent. The hip was brought into full extension and external rotation. There was no evidence of instability. As well, x-rays were printed and compared with the opposite side and found to have good length and restoration of offset.  It was felt that an additional stem size could not be placed.   The hip was then dislocated and then the proximal femur was then brought back up into the proximal aspect of the wound. The real size 8 actis stem, high offset was impacted into place. Again this gave excellent torsion as well as axial stability. The real +5mm ceramic biolox head was impacted into place and the hip was reduced again. The image intensification confirmed excellent position of the components.   A 3 minute dilute betadine soak was performed.  The wound was thoroughly irrigated. One gram of vancomycin powder was placed deep and superficial prior to closure.  The capsule was closed with interrupted 0 Vicryl suture and tissues infiltrated with  toradol/marcaine mixture. The tensor fascia was closed with a running 0 Stratafix suture. The subcutaneous layer was closed with interrupted 2-0 Vicryl, 2-0 Stratafix, and 3-0 subcuticular monocryl was placed followed by a mesh dressing with skin glue. Sterile dressing was applied. The patient left the operating room in satisfactory condition. Patient received 1 gm of tranexamic acid pre-op.    A skilled first assistant was necessary for this procedure for assistance with patient positioning, prepping, draping, surgical visualization, performance of the repair, wound closure, and application of the dressing.

## (undated) DEVICE — SYR 50ML LL W/O NDL 309653

## (undated) DEVICE — DRAPE CONVERTORS U-DRAPE 60X72" 8476

## (undated) DEVICE — SU VICRYL 2-0 CP-1 27" UND J266H

## (undated) DEVICE — BLADE SAW SAGITTAL STRK 18X90X1.27MM HD SYS 6 6118-127-090

## (undated) DEVICE — PACK TOTAL HIP W/U DRAPE SOP15HUFSC

## (undated) DEVICE — SUCTION IRR SYSTEM W/O TIP INTERPULSE HANDPIECE 0210-100-000

## (undated) DEVICE — GLOVE PROTEXIS POWDER FREE 8.5 ORTHOPEDIC 2D73ET85

## (undated) DEVICE — SOL NACL 0.9% INJ 1000ML BAG 07983-09

## (undated) DEVICE — GLOVE PROTEXIS W/NEU-THERA 8.5  2D73TE85

## (undated) DEVICE — SOL NACL 0.9% INJ 250ML BAG 2B1322Q

## (undated) DEVICE — ESU GROUND PAD UNIVERSAL W/O CORD

## (undated) DEVICE — SU MONOCRYL 3-0 PS-2 27" Y427H

## (undated) DEVICE — ESU GROUND PAD E7506

## (undated) DEVICE — DRAPE SHEET REV FOLD 3/4 9349

## (undated) DEVICE — SU ETHIBOND 2 V-37 4X30" MX69G

## (undated) DEVICE — ESU BIPOLAR SEALER AQUAMANTYS 6MM 23-112-1

## (undated) DEVICE — CLOSURE SYS SKIN PREMIERPRO EXOFIN FUSION 4X22CM STRL 3472

## (undated) DEVICE — MANIFOLD NEPTUNE 4 PORT 700-20

## (undated) DEVICE — HOOD FLYTE 0408-800-000

## (undated) DEVICE — DRAPE STERI U 1015

## (undated) DEVICE — DRAPE C-ARM 60X42" 1013

## (undated) DEVICE — PREP CHLORAPREP 26ML TINTED ORANGE  260815

## (undated) DEVICE — SOLUTION WOUND CLEANSING 3/4OZ 10% PVP EA-L3011FB-50

## (undated) DEVICE — SU VICRYL 0 CTX CR 8X18" J764D

## (undated) DEVICE — GLOVE PROTEXIS W/NEU-THERA 6.5  2D73TE65

## (undated) DEVICE — NDL 20GA 1.5"

## (undated) DEVICE — KIT PATIENT CARE HANA TABLE PROFX SUPINE 6855

## (undated) DEVICE — PREP CHLORAPREP 26ML TINTED HI-LITE ORANGE 930815

## (undated) DEVICE — SOL NACL 0.9% IRRIG 1000ML BOTTLE 2F7124

## (undated) DEVICE — SU WND CLOSURE VLOC 180 ABS 2-0 12" GS-21 VLOCL0315

## (undated) DEVICE — DRSG TELFA ISLAND 4X14" 7544

## (undated) DEVICE — LINEN TOWEL PACK X5 5464

## (undated) DEVICE — SU STRATAFIX PDS PLUS 0 CT 45CM SXPP1A406

## (undated) DEVICE — SUCTION TIP YANKAUER STR K87

## (undated) DEVICE — NDL 22GA 1.5"

## (undated) DEVICE — HOOD FLYTE W/PEELAWAY 408-800-100

## (undated) DEVICE — BONE CLEANING TIP INTERPULSE  0210-010-000

## (undated) DEVICE — Device

## (undated) DEVICE — SOL NACL 0.9% IRRIG 1000ML BOTTLE 07138-09

## (undated) DEVICE — SOL NACL 0.9% INJ 1000ML BAG 2B1324X

## (undated) DEVICE — SOL WATER IRRIG 1000ML BOTTLE 2F7114

## (undated) DEVICE — GLOVE PROTEXIS BLUE W/NEU-THERA 6.5  2D73EB65

## (undated) DEVICE — GLOVE PROTEXIS BLUE W/NEU-THERA 8.5  2D73EB85

## (undated) DEVICE — CATH TRAY COUDE 16FR LUBRICATH 2000ML BAG LATEX 901116

## (undated) DEVICE — CLOSURE SYS SKIN PREMIERPRO EXOFINFUSION 4X60CM 3473

## (undated) DEVICE — GLOVE PROTEXIS POWDER FREE 7.0 ORTHOPEDIC 2D73ET70

## (undated) DEVICE — SU WND CLOSURE VLOC 180 ABS 0 24" GS-25 VLOCL0436

## (undated) DEVICE — DRAPE IOBAN ISOLATION VERTICAL 320X21CM 6617

## (undated) DEVICE — GLOVE PROTEXIS BLUE W/NEU-THERA 7.5  2D73EB75

## (undated) DEVICE — SU STRATAFIX PDS PLUS 2-0 SPIRAL CT-1 30CM SXPP1B410

## (undated) DEVICE — BNDG COBAN 4"X5YDS STERILE

## (undated) DEVICE — ADH REMOVER PAD UNI-SOLVE 402300

## (undated) DEVICE — ESU PENCIL W/SMOKE EVAC NEPTUNE STRYKER 0703-046-000

## (undated) DEVICE — BLADE SAW SAGITTAL STRK 19.5X95X1.27MM 2108-109-000S15

## (undated) DEVICE — BLADE CLIPPER 4412A

## (undated) DEVICE — WIPES FOLEY CARE SURESTEP PROVON DFC100

## (undated) DEVICE — SU DERMABOND PRINEO CLR602US

## (undated) DEVICE — DRAPE POUCH INSTRUMENT 1018

## (undated) DEVICE — DRAPE IOBAN INCISE 23X17" 6650EZ

## (undated) RX ORDER — GLYCOPYRROLATE 0.2 MG/ML
INJECTION, SOLUTION INTRAMUSCULAR; INTRAVENOUS
Status: DISPENSED
Start: 2017-03-28

## (undated) RX ORDER — TRANEXAMIC ACID 650 MG/1
TABLET ORAL
Status: DISPENSED
Start: 2022-09-07

## (undated) RX ORDER — HYDROMORPHONE HYDROCHLORIDE 1 MG/ML
INJECTION, SOLUTION INTRAMUSCULAR; INTRAVENOUS; SUBCUTANEOUS
Status: DISPENSED
Start: 2022-09-07

## (undated) RX ORDER — DEXAMETHASONE SODIUM PHOSPHATE 4 MG/ML
INJECTION, SOLUTION INTRA-ARTICULAR; INTRALESIONAL; INTRAMUSCULAR; INTRAVENOUS; SOFT TISSUE
Status: DISPENSED
Start: 2022-09-07

## (undated) RX ORDER — PREGABALIN 150 MG/1
CAPSULE ORAL
Status: DISPENSED
Start: 2022-09-07

## (undated) RX ORDER — FENTANYL CITRATE 50 UG/ML
INJECTION, SOLUTION INTRAMUSCULAR; INTRAVENOUS
Status: DISPENSED
Start: 2022-09-07

## (undated) RX ORDER — KETOROLAC TROMETHAMINE 30 MG/ML
INJECTION, SOLUTION INTRAMUSCULAR; INTRAVENOUS
Status: DISPENSED
Start: 2017-03-28

## (undated) RX ORDER — KETOROLAC TROMETHAMINE 15 MG/ML
INJECTION, SOLUTION INTRAMUSCULAR; INTRAVENOUS
Status: DISPENSED
Start: 2017-03-28

## (undated) RX ORDER — GLYCOPYRROLATE 0.2 MG/ML
INJECTION, SOLUTION INTRAMUSCULAR; INTRAVENOUS
Status: DISPENSED
Start: 2022-09-07

## (undated) RX ORDER — LIDOCAINE HYDROCHLORIDE 20 MG/ML
INJECTION, SOLUTION EPIDURAL; INFILTRATION; INTRACAUDAL; PERINEURAL
Status: DISPENSED
Start: 2022-09-07

## (undated) RX ORDER — PROPOFOL 10 MG/ML
INJECTION, EMULSION INTRAVENOUS
Status: DISPENSED
Start: 2017-03-28

## (undated) RX ORDER — FENTANYL CITRATE 50 UG/ML
INJECTION, SOLUTION INTRAMUSCULAR; INTRAVENOUS
Status: DISPENSED
Start: 2017-03-28

## (undated) RX ORDER — NEOSTIGMINE METHYLSULFATE 1 MG/ML
VIAL (ML) INJECTION
Status: DISPENSED
Start: 2022-09-07

## (undated) RX ORDER — BUPIVACAINE HYDROCHLORIDE AND EPINEPHRINE 5; 5 MG/ML; UG/ML
INJECTION, SOLUTION EPIDURAL; INTRACAUDAL; PERINEURAL
Status: DISPENSED
Start: 2017-03-28

## (undated) RX ORDER — ONDANSETRON 2 MG/ML
INJECTION INTRAMUSCULAR; INTRAVENOUS
Status: DISPENSED
Start: 2017-03-28

## (undated) RX ORDER — CEFAZOLIN SODIUM 2 G/100ML
INJECTION, SOLUTION INTRAVENOUS
Status: DISPENSED
Start: 2017-03-28

## (undated) RX ORDER — PROPOFOL 10 MG/ML
INJECTION, EMULSION INTRAVENOUS
Status: DISPENSED
Start: 2022-09-07

## (undated) RX ORDER — ACETAMINOPHEN 325 MG/1
TABLET ORAL
Status: DISPENSED
Start: 2022-09-07

## (undated) RX ORDER — ONDANSETRON 2 MG/ML
INJECTION INTRAMUSCULAR; INTRAVENOUS
Status: DISPENSED
Start: 2022-09-07

## (undated) RX ORDER — ACETAMINOPHEN 500 MG
TABLET ORAL
Status: DISPENSED
Start: 2017-03-28

## (undated) RX ORDER — HYDROMORPHONE HYDROCHLORIDE 1 MG/ML
INJECTION, SOLUTION INTRAMUSCULAR; INTRAVENOUS; SUBCUTANEOUS
Status: DISPENSED
Start: 2017-03-28

## (undated) RX ORDER — LIDOCAINE HYDROCHLORIDE 20 MG/ML
INJECTION, SOLUTION EPIDURAL; INFILTRATION; INTRACAUDAL; PERINEURAL
Status: DISPENSED
Start: 2017-03-28

## (undated) RX ORDER — VANCOMYCIN HYDROCHLORIDE 1 G/20ML
INJECTION, POWDER, LYOPHILIZED, FOR SOLUTION INTRAVENOUS
Status: DISPENSED
Start: 2022-09-07

## (undated) RX ORDER — VECURONIUM BROMIDE 1 MG/ML
INJECTION, POWDER, LYOPHILIZED, FOR SOLUTION INTRAVENOUS
Status: DISPENSED
Start: 2017-03-28